# Patient Record
Sex: MALE | Race: ASIAN | NOT HISPANIC OR LATINO | URBAN - METROPOLITAN AREA
[De-identification: names, ages, dates, MRNs, and addresses within clinical notes are randomized per-mention and may not be internally consistent; named-entity substitution may affect disease eponyms.]

---

## 2019-03-13 ENCOUNTER — EMERGENCY (EMERGENCY)
Facility: HOSPITAL | Age: 29
LOS: 1 days | Discharge: HOME | End: 2019-03-13
Attending: EMERGENCY MEDICINE | Admitting: EMERGENCY MEDICINE
Payer: MEDICAID

## 2019-03-13 VITALS
SYSTOLIC BLOOD PRESSURE: 146 MMHG | RESPIRATION RATE: 14 BRPM | TEMPERATURE: 98 F | DIASTOLIC BLOOD PRESSURE: 102 MMHG | HEART RATE: 100 BPM | OXYGEN SATURATION: 98 %

## 2019-03-13 PROCEDURE — 99283 EMERGENCY DEPT VISIT LOW MDM: CPT | Mod: 25

## 2019-03-13 NOTE — ED ADULT NURSE NOTE - NS PRO AD NO ADVANCE DIRECTIVE
Subsequent Progress Note  Patient: Milana Colon  YOB: 1944  MRN: [id number]    Chief Complaint: CAD dizzy  Chief Complaint   Patient presents with    Coronary Artery Disease     1 yr f/u  [place] pt    Hypertension     Past Data:  1/2016 NSTEMI RCA KRISTA  Subjective/HPI  No angina  occ brief dizizness no falls no bleed  Breathing ok  1/2 PPD currently. regualr etoh         EKG:  Past Medical History:   Diagnosis Date    Alcohol abuse     CAD (coronary artery disease)     patient states no doctor has told him this / has 1 cardiac stent    Chronic back pain     Chronic kidney disease     Chronic sinusitis     DJD (degenerative joint disease) of knee     left knee DJD    Elevated PSA     History of blood transfusion 1980's    History of inferior wall myocardial infarction 01/2016    1 cardiac stent    HTN (hypertension)     meds .  1 yr    Hyperlipidemia     meds > 12 yrs    Smoker        Past Surgical History:   Procedure Laterality Date    ABDOMEN SURGERY  1961    spleenectomy due to [address]  2009    lumbar disc OR    CARDIAC SURGERY      stents    COLONOSCOPY      CORONARY ANGIOPLASTY WITH STENT PLACEMENT  1/29/2016    EYE SURGERY      to have Phaco with IOL OU 2/2018    HERNIA REPAIR      JOINT REPLACEMENT Left 1994    LTHR    JOINT REPLACEMENT Right 2009    RTKR    KNEE SURGERY Right 2011    arthroscopy    [place] MANIPULATN KNEE JT+ANESTHESIA Right 5/12/2017    RIGHT KNEE MANIPULATION UNDER ANESTHESIA performed by Michael Recinos MD at [address] OFFICE/OUTPT VISIT,PROCEDURE ONLY Bilateral 12/29/2017    RIGHT AND BILATERAL L 4-5 LYSIS OF SCAR DISKECTOMY INTERBODY CAGE FUSION POSTEROLATERAL FUSION PEDICLE SCREWS performed by Michele Munoz MD at [address] [place] [address]  2004    benign    SPLENECTOMY  1961    TOTAL KNEE ARTHROPLASTY Right 3/24/2017    RIGHT  KNEE TOTAL ARTHROPLASTY, ELHAM CEMENTED PERSONA  performed by Michael Recinos MD at [facility] HGB 11.9 12/26/2017    HCT 36.1 12/26/2017    .3 12/26/2017    MCH 33.8 12/26/2017    MCHC 33.0 12/26/2017    RDW 13.2 12/26/2017     12/26/2017     Lipids:  Lab Results   Component Value Date    CHOL 165 03/28/2016    CHOL 214 (H) 01/29/2016    CHOL 194 04/28/2014     Lab Results   Component Value Date    TRIG 64 03/28/2016    TRIG 144 01/29/2016    TRIG 91 04/28/2014     Lab Results   Component Value Date     (H) 03/28/2016    HDL 92 (H) 01/29/2016    HDL 82 (H) 04/28/2014     Lab Results   Component Value Date    LDLCALC 40 03/28/2016    LDLCALC 93 01/29/2016    LDLCALC 94 04/28/2014     No results found for: LABVLDL, VLDL  No results found for: CHOLHDLRATIO  CMP:    Lab Results   Component Value Date     12/26/2017    K 4.9 12/26/2017     12/26/2017    CO2 26 12/26/2017    BUN 16 12/26/2017    CREATININE 0.57 12/26/2017    GFRAA >60.0 12/26/2017    LABGLOM >60.0 12/26/2017    GLUCOSE 87 12/26/2017    PROT 6.6 03/28/2016    LABALBU 4.4 03/28/2016    CALCIUM 9.5 12/26/2017    BILITOT 0.7 03/28/2016    ALKPHOS 64 03/28/2016    AST 14 03/28/2016    ALT 22 03/28/2016     BMP:    Lab Results   Component Value Date     12/26/2017    K 4.9 12/26/2017     12/26/2017    CO2 26 12/26/2017    BUN 16 12/26/2017    LABALBU 4.4 03/28/2016    CREATININE 0.57 12/26/2017    CALCIUM 9.5 12/26/2017    GFRAA >60.0 12/26/2017    LABGLOM >60.0 12/26/2017    GLUCOSE 87 12/26/2017     Magnesium:    Lab Results   Component Value Date    MG 2.1 03/25/2017     TSH:  Lab Results   Component Value Date    TSH 0.851 01/29/2016       Patient Active Problem List   Diagnosis    HTN (hypertension)    Tobacco use    Hip pain    Knee pain    Chronic back pain    Elevated PSA    Primary osteoarthritis of right knee    Spondylosis of lumbar region without myelopathy or radiculopathy    Sacroiliitis, not elsewhere classified ([address]    Pure hypercholesterolemia    Charcot's joint of left ankle    Left ankle pain    Confusion caused by a drug ([address]    DDD (degenerative disc disease), lumbar    Osteoarthritis of spine with myelopathy, lumbosacral region    Chronic bilateral low back pain with bilateral sciatica    Postlaminectomy syndrome, lumbar region    Acquired spondylolisthesis of lumbosacral region    Neurogenic claudication due to lumbar spinal stenosis    HNP (herniated nucleus pulposus), lumbar    Spondylolisthesis at L4-L5 level    Anesthesia    Herniated nucleus pulposus, L4-5    NSTEMI [facility]non-ST elevated [facility] infarction) [facility]    S/P PTCA (percutaneous transluminal coronary angioplasty)       Medications Discontinued During This Encounter   Medication Reason    diclofenac (VOLTAREN) 50 MG EC tablet Duplicate Order    clopidogrel (PLAVIX) 75 MG tablet Reorder    metoprolol tartrate (LOPRESSOR) 50 MG tablet Reorder    atorvastatin (LIPITOR) 20 MG tablet Reorder       Modified Medications    Modified Medication Previous Medication    ATORVASTATIN (LIPITOR) 20 MG TABLET atorvastatin (LIPITOR) 20 MG tablet       Take 1 tablet by mouth daily    Take 1 tablet by mouth daily    CLOPIDOGREL (PLAVIX) 75 MG TABLET clopidogrel (PLAVIX) 75 MG tablet       Take 1 tablet by mouth daily    Take 1 tablet by mouth daily    METOPROLOL TARTRATE (LOPRESSOR) 50 MG TABLET metoprolol tartrate (LOPRESSOR) 50 MG tablet       Take 1 tablet by mouth 2 times daily    Take 1 tablet by mouth 2 times daily       Orders Placed This Encounter   Medications    clopidogrel (PLAVIX) 75 MG tablet     Sig: Take 1 tablet by mouth daily     Dispense:  30 tablet     Refill:  3    metoprolol tartrate (LOPRESSOR) 50 MG tablet     Sig: Take 1 tablet by mouth 2 times daily     Dispense:  30 tablet     Refill:  3    atorvastatin (LIPITOR) 20 MG tablet     Sig: Take 1 tablet by mouth daily     Dispense:  30 tablet     Refill:  3       Assessment/Plan:    1.  Essential hypertension  Stable on meds  - US Carotid Artery No

## 2019-03-13 NOTE — ED PROVIDER NOTE - OBJECTIVE STATEMENT
29 year-old male 29 year-old male presents to the Emergency Department for heroin overdose.  Patient reports taking heroin at home, approx. 10PM; patient was found on the bed by bother who called EMS after patient was not responding and had shallow breathing.  EMS called; gave 2 doses of Narcan on scene and patient became responsive.  Patient mentions report snorting "his normal dose" of heroin; had been clean for the past 2 months.  No other drug use or alcohol use reported.  No SI/HI; reports no intention of harming himself.  No fevers, chills, nausea, vomiting, chest pain, shortness of breath, abdominal pain.

## 2019-03-13 NOTE — ED ADULT TRIAGE NOTE - CHIEF COMPLAINT QUOTE
Pt BIBA from home for overdose. Pt was found unresponsive with slow shallow breathing by EMS. Pt was given 2 doses Narcan on the scene and became responsive. Pt admits to snorting heroine tonight. pt has a hx of substance abuse. Pt responsive to verbal stimuli in ED.

## 2019-03-13 NOTE — ED ADULT NURSE NOTE - OBJECTIVE STATEMENT
MIKEY RN:  Pt A/Ox3 states he used heroin tonight PTA and took "his normal dose." Pt states he was recently "clean" and this is the first time he has used since. Pt denies CP, SOB or any pain/medical complaints. Pt denies SI/HI, states he did not intend to harm himself. Pt calm and cooperative at present. Brother at bedside. Pt placed on tele monitor. Gown provided. PIV inserted with aseptic technique, blood drawn, labeled at bedside with 2 pt identifiers and sent to lab. Pt and family aware of POC, NAD. Will continue to monitor.

## 2019-03-13 NOTE — ED PROVIDER NOTE - NSFOLLOWUPINSTRUCTIONS_ED_ALL_ED_FT
Follow-up with your Primary Care Physician within 24-48 hours.  Please return to the Emergency Department immediately for any new, worsening or concerning symptoms.  Copy of your lab work, imaging and/or other testing performed in the ER is attached along with your discharge paperwork.  Please take this to your Primary Care Physician for further discussion, evaluation and comparison with your prior blood work results.     Please refrain from any alcohol or drug use in the future.

## 2019-03-13 NOTE — ED PROVIDER NOTE - CLINICAL SUMMARY MEDICAL DECISION MAKING FREE TEXT BOX
29 year-old male presents to the Emergency Department for heroin overdose; req. Narcan by EMS - patient alert upon initial exam.  Plan to put patient on end-tidal CO2 for observation and observe patient; no acute concerns for threatened airway or additional doses of Narcan.

## 2019-03-13 NOTE — ED PROVIDER NOTE - PRO INTERPRETER NEED 2
Internal Medicine Subjective





- Subjective


Service Date: 18


Patient seen and examined:: with staff


Patient is:: awake, in bed


Per staff patient has:: no adverse event





Internal Medicine Objective





- Results


Result Diagrams: 


 18 18:28





 18 18:


Recent Labs: 


 Laboratory Last Values











WBC  6.9 Th/cmm (4.8-10.8)   18  18:    


 


RBC  4.82 Mil/cmm (3.80-5.80)   18  18:    


 


Hgb  14.2 gm/dL (12-16)   18  18:    


 


Hct  42.2 % (41.0-60)   18  18:    


 


MCV  87.6 fl (80-99)   18  18:    


 


MCH  29.5 pg (27.0-31.0)   18  18    


 


MCHC Differential  33.7 pg (28.0-36.0)   18  18:    


 


RDW  13.3 % (11.5-20.0)   18  18:    


 


Plt Count  215 Th/cmm (150-400)   18  18:    


 


MPV  7.1 fl  18  18:    


 


Neutrophils %  57.5 % (40.0-80.0)   18  18:    


 


Lymphocytes %  31.7 % (20.0-50.0)   18  18:    


 


Monocytes %  9.3 % (2.0-10.0)   18  18:    


 


Eosinophils %  0.9 % (0.0-5.0)   18  18:    


 


Basophils %  0.6 % (0.0-2.0)   18  18:    


 


Sodium  131 mEq/L (136-145)  L  18  18:    


 


Potassium  4.0 mEq/L (3.5-5.1)   18  18:    


 


Chloride  102 mEq/L ()   18  18:    


 


Carbon Dioxide  28.0 mEq/L (21.0-31.0)   18  18:    


 


Anion Gap  5.0  (7.0-16.0)  L  03/16/18  18:28    


 


BUN  21 mg/dL (7-25)   18  18:28    


 


Creatinine  0.7 mg/dL (0.7-1.3)   18  18:28    


 


Est GFR ( Amer)  TNP   18  18:28    


 


Est GFR (Non-Af Amer)  TNP   18  18:28    


 


BUN/Creatinine Ratio  30.0   18  18:28    


 


Glucose  94 mg/dL ()   18  18:28    


 


Calcium  9.0 mg/dL (8.6-10.3)   18  18:28    


 


Total Bilirubin  0.5 mg/dL (0.3-1.0)   18  18:28    


 


AST  18 U/L (13-39)   18  18:28    


 


ALT  13 U/L (7-52)   18  18:28    


 


Alkaline Phosphatase  42 U/L ()   18  18:28    


 


Total Protein  6.5 gm/dL (6.0-8.3)   18  18:28    


 


Albumin  3.7 gm/dL (4.2-5.5)  L  18  18:28    


 


Globulin  2.8 gm/dL  18  18:28    


 


Albumin/Globulin Ratio  1.3  (1.0-1.8)   18  18:28    


 


TSH  0.56 uIU/ml (0.34-5.60)   18  18:28    


 


Valproic Acid  47.3 ug/mL (50.0-100.0)  L  18  19:34    














- Physical Exam


Vitals and I&O: 


 Vital Signs











Temp  97 F   18 14:00


 


Pulse  61   18 14:00


 


Resp  20   18 14:00


 


BP  136/71   18 14:00


 


Pulse Ox  97   18 14:00








 Intake & Output











 18





 06:59 18:59 06:59


 


Intake Total 840 1400 


 


Balance 840 1400 


 


Intake:   


 


  Oral 840 1400 


 


Other:   


 


  # Voids 1 5 


 


  # Bowel Movements  1 











Active Medications: 


Current Medications





Acetaminophen (Tylenol)  650 mg PO Q4HR PRN


   PRN Reason: Mild Pain / Temp above 100


   Stop: 05/15/18 22:05


Al Hydrox/Mg Hydrox/Simethicone (Maalox)  30 ml PO Q4HR PRN


   PRN Reason: GI DISTRESS


   Stop: 05/15/18 22:05


Benztropine Mesylate (Cogentin)  1 mg PO BID PRN


   PRN Reason: extrapyramidal side effects


   Stop: 18 08:50


   Last Admin: 18 10:40 Dose:  1 mg


Divalproex Sodium (Depakote Er)  500 mg PO Q12HR DANYA


   PRN Reason: Protocol


   Stop: 18 08:59


   Last Admin: 18 09:19 Dose:  500 mg


Docusate Sodium (Colace)  100 mg PO BID DANYA


   Stop: 18 08:59


   Last Admin: 18 16:13 Dose:  100 mg


Levothyroxine Sodium (Synthroid)  0.1 mg PO QDAC DANYA


   Stop: 18 07:29


   Last Admin: 18 06:51 Dose:  0.1 mg


Lorazepam (Ativan)  0.5 mg PO Q4HR PRN; Protocol


   PRN Reason: Agitation


   Stop: 18 08:59


   Last Admin: 18 13:52 Dose:  0.5 mg


Magnesium Hydroxide (Milk Of Magnesia)  30 ml PO HS PRN


   PRN Reason: Constipation


Magnesium Hydroxide (Milk Of Magnesia)  30 ml PO DAILY PRN


   PRN Reason: Constipation


   Stop: 05/15/18 23:17


Metoprolol Succinate (Toprol Xl)  50 mg PO DAILY DANYA


   Stop: 18 08:59


   Last Admin: 18 09:20 Dose:  50 mg


Olanzapine (Zyprexa)  20 mg PO HS DANYA


   PRN Reason: Protocol


   Stop: 18 20:59


   Last Admin: 18 20:20 Dose:  20 mg








General: demented


HEENT: NC/AT, PERRLA, EOMI, anicteric sclerae, throat clear


Neck: Supple, No JVD, No thyromegaly, No LAD


Lungs: CTAB


Cardiovascular: RRR, Normal S1, Normal S2, without murmur


Abdomen: soft, non-tender, non-distended


Extremities: clear


Neurological: no change





- Procedures


Procedures: 


 Procedures











Procedure Code Date


 


[facility] PSYCHOTHERAPY 96914 16


 


GROUP PSYCHOTHERAPY [id number] 16


 


GROUP PSYCHOTHERAPY [id number] 16


 


GROUP PSYCHOTHERAPY [id number] 16


 


INDIVID PSYCHOTHERAP NEC 94.39 08


 


OTHER GROUP THERAPY 94.44 08/27/15


 


RECREATIONAL THERAPY 93.81 13














Internal Medicine Assmt/Plan





- Assessment


Assessment: 





1.HTN


2.HYPOTHROIDISM


3.DJD.


4.DEMENTIA..








- Plan


Plan: 





continue on current medication and diet.





Nutritional Asmnt/Malnutr-PDOC





- Dietary Evaluation


Malnutrition Findings (Please click <Entered> for more info): 








Nutritional Asmnt/Malnutrition                             Start:  18 13:

30


Text:                                                      Status: Complete    

  


Freq:                                                                          

  


 Document     18 13:30  MAKENZIE  (Rec: 18 13:36  MAKENZIE  [id number])


 Nutritional Asmnt/Malnutrition


     Patient General Information


      Nutritional Screening                      Low Risk


      Diagnosis                                  psychosis NOS


      Pertinent Medical Hx/Surgical Hx           HTN, hypothyroidism, dementia,


                                                 psychosis, DJD


      Subjective Information                     Per EMR PO intake %. Per


                                                 nurse notes, pt remains


                                                 withdrawn.


      Current Diet Order/ Nutrition Support      regular


      Pertinent Medications                      colace, synthorid


      Pertinent Labs                             3/16 Na 131, Alb 3.7


     Nutritional Hx/Data


      Height                                     1.8 m


      Height (Calculated Centimeters)            180.3


      Current Weight (lbs)                       74.389 kg


      Weight (Calculated Kilograms)              74.4


      Weight (Calculated Grams)                  15810.1


      Ideal Body Weight                          172


      Body Mass Index (BMI)                      22.8


      Weight Status                              Approriate


     GI Symptoms


      GI Symptoms                                None


      Last BM                                    3/21


      Difficult in:                              None


      Skin Integrity/Comment:                    intact


      Current %PO                                Good (%)


     Estimated Nutritional Goals


      BEE in Kcals:                              Using Current wt


      Calories/Kcals/Kg                          25-30


      Kcals Calculated                           4483-4782


      Protein:                                   Using Current wt


      Protein g/k


      Protein Calculated                         75


      Fluid: ml                                  1875-2250ml (1ml/kcal)


     Nutritional Problem


      No current Nutrition Prob


       Problem                                   N/A


     Intervention/Recommendation


      Comments                                   1. Continue with current diet


                                                 as ordered.


                                                 2. Monitor PO intake, wt, labs


                                                 and skin integrity


                                                 3. F/U as low risk in 7 days,


                                                 3/29


     Expected Outcomes/Goals


      Expected Outcomes/Goals                    1. PO intake to meet at least


                                                 75% of nutritional needs.


                                                 2. Wt stability, skin to


                                                 remain intact, labs to


                                                 approach WNL. English

## 2019-03-13 NOTE — ED ADULT NURSE NOTE - NSIMPLEMENTINTERV_GEN_ALL_ED
Implemented All Universal Safety Interventions:  [place] to call system. Call bell, personal items and telephone within reach. Instruct patient to call for assistance. Room bathroom lighting operational. Non-slip footwear when patient is off stretcher. Physically safe environment: no spills, clutter or unnecessary equipment. Stretcher in lowest position, wheels locked, appropriate side rails in place.

## 2019-03-13 NOTE — ED PROVIDER NOTE - PROGRESS NOTE DETAILS
Stacy BRANHAM: Patient reassessed; breathing spontaneously and AAOx3 - continued observation for further metabolization Stacy BRANHAM: Patient reassessed; awake, alert and ambulating w/o difficulty.  No resp. distress and breathing spontaneously throughout stay without any doses of Narcan.  Upon repeat questioning, patient declined any SI/HI.  Patient is with brother and plans to go home with him. [place]: at time of discharge pt was asymptomatic. observed in ED for several hrs w/ only improvement in symptoms. Pt awake and alert. Vitals improved. Asymptomatic. Denies SI/HI. Steady unassisted gait. Tolerating PO. Clinically sober and not withdrawing, given copy of results, comfortable for dc, outpt PMD f/u.

## 2019-03-13 NOTE — ED PROVIDER NOTE - ATTENDING CONTRIBUTION TO CARE
marc: hx from pts brother at bedside. Some hx available from pt.   past heroin uses; not used for several months until 1030pm tonite when he snorted heroin.   brother found pt unconscious; ems arrived and adminsitered narcane with good response.   in ED: pt tired but awake. he does not state why he used heroin.   exam otherwise unremarkable.   labs pending. pt will need to be reassessed. signed over 12am marc: hx from pts brother at bedside. Some hx available from pt.   past heroin uses; not used for several months until 1030pm tonite when he snorted heroin.   brother found pt unconscious; ems arrived and adminsitered narcane with good response.   in ED: pt tired but awake. he does not state why he used heroin.   exam otherwise unremarkable.   labs pending. pt will need to be reassessed. signed over 12am.

## 2019-03-14 VITALS
HEART RATE: 94 BPM | OXYGEN SATURATION: 99 % | RESPIRATION RATE: 17 BRPM | SYSTOLIC BLOOD PRESSURE: 132 MMHG | DIASTOLIC BLOOD PRESSURE: 94 MMHG

## 2019-03-14 LAB
ALBUMIN SERPL ELPH-MCNC: 5 G/DL — SIGNIFICANT CHANGE UP (ref 3.3–5)
ALP SERPL-CCNC: 59 U/L — SIGNIFICANT CHANGE UP (ref 40–120)
ALT FLD-CCNC: 25 U/L — SIGNIFICANT CHANGE UP (ref 4–41)
ANION GAP SERPL CALC-SCNC: 17 MMO/L — HIGH (ref 7–14)
APAP SERPL-MCNC: < 15 UG/ML — LOW (ref 15–25)
AST SERPL-CCNC: 25 U/L — SIGNIFICANT CHANGE UP (ref 4–40)
BASE EXCESS BLDV CALC-SCNC: 5.4 MMOL/L — SIGNIFICANT CHANGE UP
BASE EXCESS BLDV CALC-SCNC: 6.6 MMOL/L — SIGNIFICANT CHANGE UP
BASOPHILS # BLD AUTO: 0.02 K/UL — SIGNIFICANT CHANGE UP (ref 0–0.2)
BASOPHILS NFR BLD AUTO: 0.2 % — SIGNIFICANT CHANGE UP (ref 0–2)
BILIRUB SERPL-MCNC: 0.2 MG/DL — SIGNIFICANT CHANGE UP (ref 0.2–1.2)
BLOOD GAS VENOUS - CREATININE: 0.81 MG/DL — SIGNIFICANT CHANGE UP (ref 0.5–1.3)
BLOOD GAS VENOUS - CREATININE: 0.99 MG/DL — SIGNIFICANT CHANGE UP (ref 0.5–1.3)
BUN SERPL-MCNC: 14 MG/DL — SIGNIFICANT CHANGE UP (ref 7–23)
CALCIUM SERPL-MCNC: 10 MG/DL — SIGNIFICANT CHANGE UP (ref 8.4–10.5)
CHLORIDE BLDV-SCNC: 103 MMOL/L — SIGNIFICANT CHANGE UP (ref 96–108)
CHLORIDE BLDV-SCNC: 103 MMOL/L — SIGNIFICANT CHANGE UP (ref 96–108)
CHLORIDE SERPL-SCNC: 96 MMOL/L — LOW (ref 98–107)
CK SERPL-CCNC: 71 U/L — SIGNIFICANT CHANGE UP (ref 30–200)
CO2 SERPL-SCNC: 27 MMOL/L — SIGNIFICANT CHANGE UP (ref 22–31)
CREAT SERPL-MCNC: 1.11 MG/DL — SIGNIFICANT CHANGE UP (ref 0.5–1.3)
EOSINOPHIL # BLD AUTO: 0.09 K/UL — SIGNIFICANT CHANGE UP (ref 0–0.5)
EOSINOPHIL NFR BLD AUTO: 1.1 % — SIGNIFICANT CHANGE UP (ref 0–6)
ETHANOL BLD-MCNC: < 10 MG/DL — SIGNIFICANT CHANGE UP
GAS PNL BLDV: 135 MMOL/L — LOW (ref 136–146)
GAS PNL BLDV: 136 MMOL/L — SIGNIFICANT CHANGE UP (ref 136–146)
GLUCOSE BLDV-MCNC: 131 — HIGH (ref 70–99)
GLUCOSE BLDV-MCNC: 159 — HIGH (ref 70–99)
GLUCOSE SERPL-MCNC: 159 MG/DL — HIGH (ref 70–99)
HCO3 BLDV-SCNC: 27 MMOL/L — SIGNIFICANT CHANGE UP (ref 20–27)
HCO3 BLDV-SCNC: 27 MMOL/L — SIGNIFICANT CHANGE UP (ref 20–27)
HCT VFR BLD CALC: 44.4 % — SIGNIFICANT CHANGE UP (ref 39–50)
HCT VFR BLDV CALC: 39.4 % — SIGNIFICANT CHANGE UP (ref 39–51)
HCT VFR BLDV CALC: 44.9 % — SIGNIFICANT CHANGE UP (ref 39–51)
HGB BLD-MCNC: 14.3 G/DL — SIGNIFICANT CHANGE UP (ref 13–17)
HGB BLDV-MCNC: 12.8 G/DL — LOW (ref 13–17)
HGB BLDV-MCNC: 14.7 G/DL — SIGNIFICANT CHANGE UP (ref 13–17)
IMM GRANULOCYTES NFR BLD AUTO: 0.2 % — SIGNIFICANT CHANGE UP (ref 0–1.5)
LACTATE BLDV-MCNC: 1.9 MMOL/L — SIGNIFICANT CHANGE UP (ref 0.5–2)
LACTATE BLDV-MCNC: 2.3 MMOL/L — HIGH (ref 0.5–2)
LYMPHOCYTES # BLD AUTO: 3.33 K/UL — HIGH (ref 1–3.3)
LYMPHOCYTES # BLD AUTO: 39.1 % — SIGNIFICANT CHANGE UP (ref 13–44)
MCHC RBC-ENTMCNC: 28.9 PG — SIGNIFICANT CHANGE UP (ref 27–34)
MCHC RBC-ENTMCNC: 32.2 % — SIGNIFICANT CHANGE UP (ref 32–36)
MCV RBC AUTO: 89.7 FL — SIGNIFICANT CHANGE UP (ref 80–100)
MONOCYTES # BLD AUTO: 0.45 K/UL — SIGNIFICANT CHANGE UP (ref 0–0.9)
MONOCYTES NFR BLD AUTO: 5.3 % — SIGNIFICANT CHANGE UP (ref 2–14)
NEUTROPHILS # BLD AUTO: 4.6 K/UL — SIGNIFICANT CHANGE UP (ref 1.8–7.4)
NEUTROPHILS NFR BLD AUTO: 54.1 % — SIGNIFICANT CHANGE UP (ref 43–77)
NRBC # FLD: 0 K/UL — LOW (ref 25–125)
PCO2 BLDV: 63 MMHG — HIGH (ref 41–51)
PCO2 BLDV: 66 MMHG — HIGH (ref 41–51)
PH BLDV: 7.31 PH — LOW (ref 7.32–7.43)
PH BLDV: 7.32 PH — SIGNIFICANT CHANGE UP (ref 7.32–7.43)
PLATELET # BLD AUTO: 305 K/UL — SIGNIFICANT CHANGE UP (ref 150–400)
PMV BLD: 10.8 FL — SIGNIFICANT CHANGE UP (ref 7–13)
PO2 BLDV: 31 MMHG — LOW (ref 35–40)
PO2 BLDV: < 24 MMHG — LOW (ref 35–40)
POTASSIUM BLDV-SCNC: 3.6 MMOL/L — SIGNIFICANT CHANGE UP (ref 3.4–4.5)
POTASSIUM BLDV-SCNC: 4.3 MMOL/L — SIGNIFICANT CHANGE UP (ref 3.4–4.5)
POTASSIUM SERPL-MCNC: 3.8 MMOL/L — SIGNIFICANT CHANGE UP (ref 3.5–5.3)
POTASSIUM SERPL-SCNC: 3.8 MMOL/L — SIGNIFICANT CHANGE UP (ref 3.5–5.3)
PROT SERPL-MCNC: 8.1 G/DL — SIGNIFICANT CHANGE UP (ref 6–8.3)
RBC # BLD: 4.95 M/UL — SIGNIFICANT CHANGE UP (ref 4.2–5.8)
RBC # FLD: 12.3 % — SIGNIFICANT CHANGE UP (ref 10.3–14.5)
SALICYLATES SERPL-MCNC: < 5 MG/DL — LOW (ref 15–30)
SAO2 % BLDV: 34.2 % — LOW (ref 60–85)
SAO2 % BLDV: 50.3 % — LOW (ref 60–85)
SODIUM SERPL-SCNC: 140 MMOL/L — SIGNIFICANT CHANGE UP (ref 135–145)
WBC # BLD: 8.51 K/UL — SIGNIFICANT CHANGE UP (ref 3.8–10.5)
WBC # FLD AUTO: 8.51 K/UL — SIGNIFICANT CHANGE UP (ref 3.8–10.5)

## 2019-03-14 RX ORDER — SODIUM CHLORIDE 9 MG/ML
2000 INJECTION, SOLUTION INTRAVENOUS ONCE
Qty: 0 | Refills: 0 | Status: COMPLETED | OUTPATIENT
Start: 2019-03-14 | End: 2019-03-14

## 2019-03-14 RX ADMIN — SODIUM CHLORIDE 2000 MILLILITER(S): 9 INJECTION, SOLUTION INTRAVENOUS at 00:07

## 2019-12-22 ENCOUNTER — EMERGENCY (EMERGENCY)
Facility: HOSPITAL | Age: 29
LOS: 1 days | Discharge: HOME | End: 2019-12-22
Admitting: EMERGENCY MEDICINE
Payer: MEDICAID

## 2019-12-22 VITALS
OXYGEN SATURATION: 99 % | TEMPERATURE: 98 F | HEART RATE: 104 BPM | SYSTOLIC BLOOD PRESSURE: 121 MMHG | RESPIRATION RATE: 16 BRPM | DIASTOLIC BLOOD PRESSURE: 89 MMHG

## 2019-12-22 VITALS
DIASTOLIC BLOOD PRESSURE: 72 MMHG | HEART RATE: 81 BPM | SYSTOLIC BLOOD PRESSURE: 115 MMHG | TEMPERATURE: 98 F | OXYGEN SATURATION: 100 % | RESPIRATION RATE: 18 BRPM

## 2019-12-22 LAB
ALBUMIN SERPL ELPH-MCNC: 4.9 G/DL — SIGNIFICANT CHANGE UP (ref 3.3–5)
ALP SERPL-CCNC: 70 U/L — SIGNIFICANT CHANGE UP (ref 40–120)
ALT FLD-CCNC: 32 U/L — SIGNIFICANT CHANGE UP (ref 4–41)
ANION GAP SERPL CALC-SCNC: 15 MMO/L — HIGH (ref 7–14)
APPEARANCE UR: CLEAR — SIGNIFICANT CHANGE UP
APTT BLD: 32.6 SEC — SIGNIFICANT CHANGE UP (ref 27.5–36.3)
AST SERPL-CCNC: 21 U/L — SIGNIFICANT CHANGE UP (ref 4–40)
BACTERIA # UR AUTO: SIGNIFICANT CHANGE UP
BASOPHILS # BLD AUTO: 0.03 K/UL — SIGNIFICANT CHANGE UP (ref 0–0.2)
BASOPHILS NFR BLD AUTO: 0.3 % — SIGNIFICANT CHANGE UP (ref 0–2)
BILIRUB SERPL-MCNC: 0.4 MG/DL — SIGNIFICANT CHANGE UP (ref 0.2–1.2)
BILIRUB UR-MCNC: NEGATIVE — SIGNIFICANT CHANGE UP
BLOOD UR QL VISUAL: NEGATIVE — SIGNIFICANT CHANGE UP
BUN SERPL-MCNC: 13 MG/DL — SIGNIFICANT CHANGE UP (ref 7–23)
CALCIUM SERPL-MCNC: 10.1 MG/DL — SIGNIFICANT CHANGE UP (ref 8.4–10.5)
CHLORIDE SERPL-SCNC: 101 MMOL/L — SIGNIFICANT CHANGE UP (ref 98–107)
CO2 SERPL-SCNC: 22 MMOL/L — SIGNIFICANT CHANGE UP (ref 22–31)
COLOR SPEC: YELLOW — SIGNIFICANT CHANGE UP
CREAT SERPL-MCNC: 0.78 MG/DL — SIGNIFICANT CHANGE UP (ref 0.5–1.3)
EOSINOPHIL # BLD AUTO: 0.08 K/UL — SIGNIFICANT CHANGE UP (ref 0–0.5)
EOSINOPHIL NFR BLD AUTO: 0.7 % — SIGNIFICANT CHANGE UP (ref 0–6)
GLUCOSE SERPL-MCNC: 93 MG/DL — SIGNIFICANT CHANGE UP (ref 70–99)
GLUCOSE UR-MCNC: NEGATIVE — SIGNIFICANT CHANGE UP
HCT VFR BLD CALC: 44.1 % — SIGNIFICANT CHANGE UP (ref 39–50)
HGB BLD-MCNC: 14.3 G/DL — SIGNIFICANT CHANGE UP (ref 13–17)
HYALINE CASTS # UR AUTO: NEGATIVE — SIGNIFICANT CHANGE UP
IMM GRANULOCYTES NFR BLD AUTO: 0.3 % — SIGNIFICANT CHANGE UP (ref 0–1.5)
INR BLD: 0.99 — SIGNIFICANT CHANGE UP (ref 0.88–1.17)
KETONES UR-MCNC: NEGATIVE — SIGNIFICANT CHANGE UP
LEUKOCYTE ESTERASE UR-ACNC: NEGATIVE — SIGNIFICANT CHANGE UP
LYMPHOCYTES # BLD AUTO: 2.76 K/UL — SIGNIFICANT CHANGE UP (ref 1–3.3)
LYMPHOCYTES # BLD AUTO: 25.6 % — SIGNIFICANT CHANGE UP (ref 13–44)
MCHC RBC-ENTMCNC: 28.5 PG — SIGNIFICANT CHANGE UP (ref 27–34)
MCHC RBC-ENTMCNC: 32.4 % — SIGNIFICANT CHANGE UP (ref 32–36)
MCV RBC AUTO: 88 FL — SIGNIFICANT CHANGE UP (ref 80–100)
MONOCYTES # BLD AUTO: 0.53 K/UL — SIGNIFICANT CHANGE UP (ref 0–0.9)
MONOCYTES NFR BLD AUTO: 4.9 % — SIGNIFICANT CHANGE UP (ref 2–14)
MUCOUS THREADS # UR AUTO: SIGNIFICANT CHANGE UP
NEUTROPHILS # BLD AUTO: 7.35 K/UL — SIGNIFICANT CHANGE UP (ref 1.8–7.4)
NEUTROPHILS NFR BLD AUTO: 68.2 % — SIGNIFICANT CHANGE UP (ref 43–77)
NITRITE UR-MCNC: NEGATIVE — SIGNIFICANT CHANGE UP
NRBC # FLD: 0 K/UL — SIGNIFICANT CHANGE UP (ref 0–0)
PH UR: 6.5 — SIGNIFICANT CHANGE UP (ref 5–8)
PLATELET # BLD AUTO: 313 K/UL — SIGNIFICANT CHANGE UP (ref 150–400)
PMV BLD: 10.4 FL — SIGNIFICANT CHANGE UP (ref 7–13)
POTASSIUM SERPL-MCNC: 4.4 MMOL/L — SIGNIFICANT CHANGE UP (ref 3.5–5.3)
POTASSIUM SERPL-SCNC: 4.4 MMOL/L — SIGNIFICANT CHANGE UP (ref 3.5–5.3)
PROT SERPL-MCNC: 8.2 G/DL — SIGNIFICANT CHANGE UP (ref 6–8.3)
PROT UR-MCNC: 20 — SIGNIFICANT CHANGE UP
PROTHROM AB SERPL-ACNC: 11.3 SEC — SIGNIFICANT CHANGE UP (ref 9.8–13.1)
RBC # BLD: 5.01 M/UL — SIGNIFICANT CHANGE UP (ref 4.2–5.8)
RBC # FLD: 13.5 % — SIGNIFICANT CHANGE UP (ref 10.3–14.5)
RBC CASTS # UR COMP ASSIST: SIGNIFICANT CHANGE UP (ref 0–?)
SODIUM SERPL-SCNC: 138 MMOL/L — SIGNIFICANT CHANGE UP (ref 135–145)
SP GR SPEC: 1.03 — SIGNIFICANT CHANGE UP (ref 1–1.04)
SQUAMOUS # UR AUTO: SIGNIFICANT CHANGE UP
UROBILINOGEN FLD QL: NORMAL — SIGNIFICANT CHANGE UP
WBC # BLD: 10.78 K/UL — HIGH (ref 3.8–10.5)
WBC # FLD AUTO: 10.78 K/UL — HIGH (ref 3.8–10.5)
WBC UR QL: SIGNIFICANT CHANGE UP (ref 0–?)

## 2019-12-22 PROCEDURE — 99220: CPT

## 2019-12-22 PROCEDURE — 72158 MRI LUMBAR SPINE W/O & W/DYE: CPT | Mod: 26

## 2019-12-22 RX ORDER — LIDOCAINE 4 G/100G
1 CREAM TOPICAL ONCE
Refills: 0 | Status: COMPLETED | OUTPATIENT
Start: 2019-12-22 | End: 2019-12-22

## 2019-12-22 RX ORDER — CYCLOBENZAPRINE HYDROCHLORIDE 10 MG/1
10 TABLET, FILM COATED ORAL ONCE
Refills: 0 | Status: COMPLETED | OUTPATIENT
Start: 2019-12-22 | End: 2019-12-22

## 2019-12-22 RX ORDER — KETOROLAC TROMETHAMINE 30 MG/ML
30 SYRINGE (ML) INJECTION ONCE
Refills: 0 | Status: DISCONTINUED | OUTPATIENT
Start: 2019-12-22 | End: 2019-12-22

## 2019-12-22 RX ADMIN — CYCLOBENZAPRINE HYDROCHLORIDE 10 MILLIGRAM(S): 10 TABLET, FILM COATED ORAL at 11:42

## 2019-12-22 RX ADMIN — LIDOCAINE 1 PATCH: 4 CREAM TOPICAL at 11:43

## 2019-12-22 RX ADMIN — Medication 30 MILLIGRAM(S): at 11:43

## 2019-12-22 NOTE — ED CDU PROVIDER DISPOSITION NOTE - ATTENDING CONTRIBUTION TO CARE
admitted to cdu with concern for epidural absces given back pain and hx of ivdu. mri wnl. back pain improved.  Dc'd home

## 2019-12-22 NOTE — ED CDU PROVIDER INITIAL DAY NOTE - PROGRESS NOTE DETAILS
MRI neg for epidural abscess or fluid collection, significant for small disk herniation. Pt is stable for dc with spine/PT f/u.

## 2019-12-22 NOTE — ED PROVIDER NOTE - SKIN, MLM
Skin normal color for race, warm, dry and intact. No evidence of rash. Trilobed Flap Text: The defect edges were debeveled with a #15 scalpel blade.  Given the location of the defect and the proximity to free margins a trilobed flap was deemed most appropriate.  Using a sterile surgical marker, an appropriate trilobed flap drawn around the defect.    The area thus outlined was incised deep to adipose tissue with a #15 scalpel blade.  The skin margins were undermined to an appropriate distance in all directions utilizing iris scissors.

## 2019-12-22 NOTE — ED CDU PROVIDER INITIAL DAY NOTE - ATTENDING CONTRIBUTION TO CARE
28 y/o male pmh IVDA wq/ low back pain x1 month, found to have low grade fever in ER- sent to CDU for MRI  to r/o epidural abscess

## 2019-12-22 NOTE — ED CDU PROVIDER INITIAL DAY NOTE - OBJECTIVE STATEMENT
30 y/o male pmh IVDA c/o low back pain x1 month. Pt admits to stopping heroin x1 month ago. Pt admits to intermittent low back pain, achy/sharp in nature. Denies recent fall, injury or trauma. Denies numbness, tingling, weakness, dizziness, syncope, fever or chills. In the ER pt found to have low grade fever and mild tachycardia. Sent to CDU for MRI to r/o epidural abscess.

## 2019-12-22 NOTE — ED PROVIDER NOTE - OBJECTIVE STATEMENT
28 Y/O M PMH IV drug user c/o low back pain. Pt states he stopped using heroin one month ago, states he had been using for 5-6 months currently on vitriol. Pt denies numbness, weakness, tingling or bowel or bladder habits. Pt states the pain was initially intermittent currently 9/10 states the pain feels achy and is occasionally sharp. Pt states he initially attributed the pain to withdrawal but states it has continued. Denies urinary symptoms, denies numbness, tingling, weakness, bowel or bladder changes. Pt denies any other sx or complaints.

## 2019-12-22 NOTE — ED PROVIDER NOTE - CARE PLAN
Principal Discharge DX:	Low back pain  Secondary Diagnosis:	IV drug abuse Principal Discharge DX:	Low back pain  Secondary Diagnosis:	IV drug abuse  Secondary Diagnosis:	Leukocytosis

## 2019-12-22 NOTE — ED CDU PROVIDER INITIAL DAY NOTE - MEDICAL DECISION MAKING DETAILS
30 y/o male pmh IVDA wq/ low back pain x1 month, found to have low grade fever in ER- sent to CDU for MRI  to r/o epidural abscess

## 2019-12-22 NOTE — ED CDU PROVIDER DISPOSITION NOTE - PATIENT PORTAL LINK FT
You can access the FollowMyHealth Patient Portal offered by [facility] by registering at the following website: [email or web address]. By joining [employer]’s FollowMyHealth portal, you will also be able to view your health information using other applications (apps) compatible with our system.

## 2019-12-23 LAB — SPECIMEN SOURCE: SIGNIFICANT CHANGE UP

## 2019-12-24 LAB — BACTERIA UR CULT: SIGNIFICANT CHANGE UP

## 2020-01-16 ENCOUNTER — HOSPITAL ENCOUNTER (INPATIENT)
Dept: HOSPITAL 74 - YASAS | Age: 30
LOS: 3 days | Discharge: LEFT BEFORE DISCHARGE | DRG: 770 | End: 2020-01-19
Attending: NEUROMUSCULOSKELETAL MEDICINE & OMM | Admitting: NEUROMUSCULOSKELETAL MEDICINE & OMM
Payer: COMMERCIAL

## 2020-01-16 VITALS — BODY MASS INDEX: 25.3 KG/M2

## 2020-01-16 DIAGNOSIS — F17.210: ICD-10-CM

## 2020-01-16 DIAGNOSIS — F11.20: Primary | ICD-10-CM

## 2020-01-16 DIAGNOSIS — Z86.59: ICD-10-CM

## 2020-01-16 PROCEDURE — HZ42ZZZ GROUP COUNSELING FOR SUBSTANCE ABUSE TREATMENT, COGNITIVE-BEHAVIORAL: ICD-10-PCS | Performed by: ALLERGY & IMMUNOLOGY

## 2020-01-16 RX ADMIN — Medication PRN MG: at 21:39

## 2020-01-16 RX ADMIN — Medication SCH MG: at 21:39

## 2020-01-16 NOTE — PN
Teaching Attending Note


Name of Resident: Joey Bah





ATTENDING PHYSICIAN STATEMENT





I saw and evaluated the patient.


I reviewed the resident's note and discussed the case with the resident.


I agree with the resident's findings and plan as documented.








SUBJECTIVE:pt reports latest injection of Vivitrol 12/24/19 








OBJECTIVE:  wnwd 


 Vital Signs - 24 hr











  01/16/20 01/16/20





  14:19 18:09


 


Temperature 97.7 F 97.8 F


 


Pulse Rate 94 H 84


 


Respiratory 18 16





Rate  


 


Blood Pressure 114/76 133/73

















ASSESSMENT AND PLAN:  OUD on antagonist therapy - rehab

## 2020-01-16 NOTE — HP
COWS





- Scale


Resting Pulse: 1= MD 


Sweatin= Chills/Flushing


Restless Observation: 0= Sits Still


Pupil Size: 1= Pupils >than Normal


Bone or Joint Aches: 1= Mild Discomfort


Runny Nose/ Eye Tearin= Nasal Congestion


GI Upset > 30mins: 2= Nausea/Diarrhea


Tremor Observation: 1= Tremor Felt, Not Seen


Yawning Observation: 0= None


Anxiety or Irritability: 1=Feels Anxious/Irritable


Goose Flesh Skin: 0=Smooth Skin


COWS Score: 9





CIWA Score





- Admission Criteria


[facility]ASAS Guidelines: Admission for Medically Managed Detox: 


Requires at least one of the followin. CIWA greater than 12


2. Seizures within the past 24 hours


3. Delirium tremens within the past 24 hours


4. Hallucinations within the past 24 hours


5. Acute intervention needed for co  occurring medical disorder


6. Acute intervention needed for co  occurring psychiatric disorder


7. Severe withdrawal that cannot be handled at a lower level of care (continued


    vomiting, continued diarrhea, abnormal vital signs) requiring intravenous


    medication and/or fluids


8. Pregnancy








Admitting History and Physical





- Admission


History of Present Illness: 





31 yo m w/ PMH herion abuse comes in for detox from heroin.





Patient endorses using 6-8 bags of herion IV daily for the past 2 weeks. He was 

recently admitted to detox at [facility] and was discharged on outpatient 

Vivitrol. His last Vivitrol injection was . After discharge from 

[facility], he was clean for 2 months until 2 weeks ago. Patient endorses 

overdosing 8 months ago. Patient has a narcan kit at home.





Patient was tested for HIV/HCV 2 months ago at [facility] and states that they 

were both negative. Patient declined repeat testing at this time.





Patient endorses smoking 2 cigarettes per day.  





Patient does not meet detox criteria as he is on vivitrol. 


Patient agrees to stay for rehab, however.   


History Source: Patient


Limitations to Obtaining History: No Limitations





- Past Medical History


Psych: Yes: Addictions





- Past Surgical History


Past Surgical History: Yes: None





- Smoking History


Smoking history: Current every day smoker


Have you smoked in the past 12 months: Yes


Aproximately how many cigarettes per day: 2





Admission ROS S





- HPI


Allergies/Adverse Reactions: 


 Allergies











Allergy/AdvReac Type Severity Reaction Status Date / Time


 


No Known Allergies Allergy   Verified 20 14:19














- Ebola screening


Have you traveled outside of the country in the last 21 days: No


Have you had contact with anyone from an Ebola affected area: No





- Review of Systems


Constitutional: Weakness


Musculoskeletal: reports: Back Pain


Psychiatric: reports: Judgement Intact, Mood/Affect Appropiate, Orientated x3





Patient History





- Smoking Cessation


Smoking history: Current every day smoker


Have you smoked in the past 12 months: Yes


Initiated information on smoking cessation: Yes


'Breaking Loose' booklet given: 20





- Substances abused


  ** Heroin


Substance route: Injection


Frequency: Daily


Amount used: 6-7 bags


Age of first use: 23


Date of last use: 01/15/20





Admission Physical Exam [facility]





- Vital Signs


Vital Signs: 


 Vital Signs - 24 hr











  20





  14:19


 


Temperature 97.7 F


 


Pulse Rate 94 H


 


Respiratory 18





Rate 


 


Blood Pressure 114/76














- Physical


General Appearance: Yes: No Apparent Distress, Nourished, Appropriately Dressed


HEENTM: Yes: EOMI, Normal ENT Inspection, KASSANDRA


Respiratory: Yes: Chest Non-Tender, Lungs Clear, Normal Breath Sounds, No 

Respiratory Distress, No Accessory Muscle Use


Neck: Yes: Trachea in good position


Cardiology: Yes: Regular Rhythm, Regular Rate, S1, S2.  No: JVD, Murmur, Gallop/

S3, Gallop/S4


Abdominal: Yes: Normal Bowel Sounds, Non Tender, Flat, Soft


Neurological: Yes: CNs II-XII NML intact, Fully Oriented, Alert, Motor Strength 

5/5, Normal Mood/Affect, Normal Response


Integumentary: Yes: Normal Color, Dry, Warm





- Diagnostic


(1) Opioid dependence


Current Visit: Yes   Status: Acute   





Breathalyzer





- Breathalyzer


Breathalyzer: 0





Urine Drug Screen





- Test Device


Lot number: [id number]


Expiration date: 21





- Control


Is test valid?: Yes





- Results


Drug screen NEGATIVE: No


Urine drug screen results: MOP-Opiates





Inpatient Rehab Admission





- Rehab Decision to Admit


Inpatient rehab admission?: Yes





- Initial Determination


Are CD services needed?: Yes


Free of communicable disease: Yes


Not in need of hospitalization: Yes





- Rehab Admission Criteria


Previous failed treatment: Yes


Poor recovery environment: Yes


Comorbidities: No


Lacks judgement: No


Patient is meeting Inpatient Rehab admission criteria:: Yes

## 2020-01-17 LAB
ALBUMIN SERPL-MCNC: 4.2 G/DL (ref 3.4–5)
ALP SERPL-CCNC: 68 U/L (ref 45–117)
ALT SERPL-CCNC: 33 U/L (ref 13–61)
ANION GAP SERPL CALC-SCNC: 5 MMOL/L (ref 8–16)
AST SERPL-CCNC: 23 U/L (ref 15–37)
BILIRUB SERPL-MCNC: 0.6 MG/DL (ref 0.2–1)
BUN SERPL-MCNC: 18.4 MG/DL (ref 7–18)
CALCIUM SERPL-MCNC: 9.5 MG/DL (ref 8.5–10.1)
CHLORIDE SERPL-SCNC: 106 MMOL/L (ref 98–107)
CO2 SERPL-SCNC: 28 MMOL/L (ref 21–32)
CREAT SERPL-MCNC: 0.9 MG/DL (ref 0.55–1.3)
DEPRECATED RDW RBC AUTO: 14.8 % (ref 11.9–15.9)
GLUCOSE SERPL-MCNC: 91 MG/DL (ref 74–106)
HCT VFR BLD CALC: 39.8 % (ref 35.4–49)
HGB BLD-MCNC: 13.4 GM/DL (ref 11.7–16.9)
MCH RBC QN AUTO: 28.8 PG (ref 25.7–33.7)
MCHC RBC AUTO-ENTMCNC: 33.7 G/DL (ref 32–35.9)
MCV RBC: 85.4 FL (ref 80–96)
PLATELET # BLD AUTO: 274 K/MM3 (ref 134–434)
PMV BLD: 8.6 FL (ref 7.5–11.1)
POTASSIUM SERPLBLD-SCNC: 4.6 MMOL/L (ref 3.5–5.1)
PROT SERPL-MCNC: 7.5 G/DL (ref 6.4–8.2)
RBC # BLD AUTO: 4.66 M/MM3 (ref 4–5.6)
SODIUM SERPL-SCNC: 139 MMOL/L (ref 136–145)
WBC # BLD AUTO: 4.5 K/MM3 (ref 4–10)

## 2020-01-17 RX ADMIN — Medication SCH MG: at 21:33

## 2020-01-17 RX ADMIN — Medication SCH TAB: at 10:04

## 2020-01-17 RX ADMIN — HYDROXYZINE PAMOATE PRN MG: 50 CAPSULE ORAL at 21:33

## 2020-01-17 RX ADMIN — Medication PRN MG: at 21:33

## 2020-01-17 RX ADMIN — METHOCARBAMOL PRN MG: 500 TABLET ORAL at 16:55

## 2020-01-17 RX ADMIN — HYDROXYZINE PAMOATE PRN MG: 50 CAPSULE ORAL at 10:04

## 2020-01-17 NOTE — PN
BHS Progress Note


Note: 





Pt is a 29 y/o male with a hx of I.V Heroin dependence admitted to rehab on 1/16 /20 through [facility]. Pt reports he is on Vivitrol MAT last dose on 1/24/19 with 

[place], [place] with Suboxone prescriber, Dr. Euceda. Pt 

reports he has been using Heroin for past 2 weeks  before coming here and is 

currently having w/s-hot/cold sweats/chills, no BM x 2-3 days, back pain-hx of 

herniated disc(goes to PT and not on medication for pain). Pt requesting 

medication to alleviate his symptoms. Pt also reports hx of Insomnia and took 

Trazodone. Reports depressed sometimes and requests psych consult. shawnee patrick/h/i 

at this time.


 


 


 Vital Signs - 24 hr











  01/16/20 01/16/20 01/17/20





  14:19 18:09 00:46


 


Temperature 97.7 F 97.8 F 


 


Pulse Rate 94 H 84 


 


Respiratory 18 16 18





Rate   


 


Blood Pressure 114/76 133/73 














  01/17/20 01/17/20





  03:30 07:35


 


Temperature  97.7 F


 


Pulse Rate  69


 


Respiratory 18 18





Rate  


 


Blood Pressure  109/65








Alert o x 3,


nad


oob ambulating with steady gait.


extremities/skin:no edema, skin intact.





A/P


new rehab pt





maintain safety


increase po fluids


motrin prn as directed for pain


robaxin prn as directed for pain


follow up with psych consult.

## 2020-01-18 LAB
APPEARANCE UR: CLEAR
BILIRUB UR STRIP.AUTO-MCNC: NEGATIVE MG/DL
COLOR UR: YELLOW
KETONES UR QL STRIP: NEGATIVE
LEUKOCYTE ESTERASE UR QL STRIP.AUTO: NEGATIVE
NITRITE UR QL STRIP: NEGATIVE
PH UR: 5.5 [PH] (ref 5–8)
PROT UR QL STRIP: NEGATIVE
PROT UR QL STRIP: NEGATIVE
SP GR UR: 1.03 (ref 1.01–1.03)
UROBILINOGEN UR STRIP-MCNC: 0.2 MG/DL (ref 0.2–1)

## 2020-01-18 RX ADMIN — Medication PRN MG: at 21:38

## 2020-01-18 RX ADMIN — Medication SCH MG: at 21:37

## 2020-01-18 RX ADMIN — METHOCARBAMOL PRN MG: 500 TABLET ORAL at 10:50

## 2020-01-18 RX ADMIN — HYDROXYZINE PAMOATE PRN MG: 50 CAPSULE ORAL at 21:37

## 2020-01-18 RX ADMIN — Medication SCH TAB: at 10:49

## 2020-01-19 VITALS — DIASTOLIC BLOOD PRESSURE: 68 MMHG | HEART RATE: 82 BPM | TEMPERATURE: 96.9 F | SYSTOLIC BLOOD PRESSURE: 115 MMHG

## 2020-01-19 RX ADMIN — HYDROXYZINE PAMOATE PRN MG: 50 CAPSULE ORAL at 10:13

## 2020-01-19 RX ADMIN — METHOCARBAMOL PRN MG: 500 TABLET ORAL at 10:13

## 2020-01-19 RX ADMIN — Medication SCH TAB: at 10:11

## 2020-03-09 ENCOUNTER — EMERGENCY (EMERGENCY)
Facility: HOSPITAL | Age: 30
LOS: 1 days | Discharge: HOME | End: 2020-03-09
Attending: EMERGENCY MEDICINE | Admitting: EMERGENCY MEDICINE
Payer: MEDICAID

## 2020-03-09 VITALS
OXYGEN SATURATION: 100 % | SYSTOLIC BLOOD PRESSURE: 139 MMHG | HEART RATE: 95 BPM | DIASTOLIC BLOOD PRESSURE: 97 MMHG | TEMPERATURE: 98 F | RESPIRATION RATE: 16 BRPM

## 2020-03-09 PROCEDURE — 99284 EMERGENCY DEPT VISIT MOD MDM: CPT

## 2020-03-09 NOTE — ED ADULT NURSE NOTE - OBJECTIVE STATEMENT
Pt received to room 18 due to overdose today. AxOx3 and ambulatory at baseline. Pt states he used 1 bag of heroine today and overdosed. Pt states his brother found him overdosed and gave him two doses of Narcan. Pt denies any other alcohol or drug use today. Respirations even and unlabored. NSR on cardiac monitor. Pt denies headache, dizziness, chest pain, SOB, nausea, vomiting, diarrhea, fever, chills and cough at this time. No IV placed at this time. Pt appears to be resting comfortably in bed and in no acute distress at this time. Awaiting MD orders. Pt's belongings placed across from room 21. Will continue to monitor.

## 2020-03-09 NOTE — ED ADULT NURSE NOTE - NSIMPLEMENTINTERV_GEN_ALL_ED
Implemented All Universal Safety Interventions:  [place] to call system. Call bell, personal items and telephone within reach. Instruct patient to call for assistance. Room bathroom lighting operational. Non-slip footwear when patient is off stretcher. Physically safe environment: no spills, clutter or unnecessary equipment. Stretcher in lowest position, wheels locked, appropriate side rails in place.

## 2020-03-09 NOTE — ED ADULT NURSE NOTE - DOES PATIENT HAVE ADVANCE DIRECTIVE
Low symptomatic burden.  Patient prefers to avoid medical treatment at this point, which is reasonable.  No evidence of structural heart disease on echocardiogram and she has had no high risk symptoms.   No

## 2020-03-09 NOTE — ED ADULT TRIAGE NOTE - CHIEF COMPLAINT QUOTE
Pt. overdose on Heroin. family give 2 doses of Narcan and patient was more arousable. Pt. A&O x2 in triage.

## 2020-03-10 VITALS
RESPIRATION RATE: 15 BRPM | DIASTOLIC BLOOD PRESSURE: 68 MMHG | OXYGEN SATURATION: 100 % | TEMPERATURE: 98 F | SYSTOLIC BLOOD PRESSURE: 115 MMHG | HEART RATE: 67 BPM

## 2020-03-10 NOTE — ED PROVIDER NOTE - PATIENT PORTAL LINK FT
You can access the FollowMyHealth Patient Portal offered by [facility] by registering at the following website: [email or web address]. By joining [employer]’s FollowMyHealth portal, you will also be able to view your health information using other applications (apps) compatible with our system.

## 2020-03-10 NOTE — ED PROVIDER NOTE - NSFOLLOWUPINSTRUCTIONS_ED_ALL_ED_FT
You were seen for heroin overdose.     Do not use heroin. It is life threatening as it can cause you to go unconscious and stop breathing.     Follow up with your PCP this week to discuss treatment options to prevent opiod use.     Seek immediate medical assistance for any new or worsening symptoms.    See Referral sheet given to you for substance abuse help.

## 2020-03-10 NOTE — ED PROVIDER NOTE - CLINICAL SUMMARY MEDICAL DECISION MAKING FREE TEXT BOX
unintentional heroin overdose, non suicidal, improved with intranasal narcan. Plan: close observation and monitoring, counseling on substance abuse dangers and treatment options

## 2020-03-10 NOTE — ED PROVIDER NOTE - OBJECTIVE STATEMENT
30M states he snorted heroin tonight at about 8:45pm after not using for several weeks. He passed out and received intranasal narcan by his brother. He states he is awake and has no acute symptoms except slight lethargy. He denies any other substances. He denies he was trying to kill or harm himself. He just wanted to get high.

## 2020-04-01 ENCOUNTER — OUTPATIENT (OUTPATIENT)
Dept: OUTPATIENT SERVICES | Facility: HOSPITAL | Age: 30
LOS: 1 days | End: 2020-04-01
Payer: MEDICAID

## 2020-04-01 PROCEDURE — G9001: CPT

## 2020-04-02 DIAGNOSIS — Z71.89 OTHER SPECIFIED COUNSELING: ICD-10-CM

## 2020-12-31 ENCOUNTER — EMERGENCY (EMERGENCY)
Facility: HOSPITAL | Age: 30
LOS: 1 days | Discharge: HOME | End: 2020-12-31
Attending: PERSONAL EMERGENCY RESPONSE ATTENDANT | Admitting: PERSONAL EMERGENCY RESPONSE ATTENDANT
Payer: MEDICAID

## 2020-12-31 VITALS
OXYGEN SATURATION: 100 % | RESPIRATION RATE: 16 BRPM | SYSTOLIC BLOOD PRESSURE: 134 MMHG | DIASTOLIC BLOOD PRESSURE: 87 MMHG | HEART RATE: 95 BPM | TEMPERATURE: 98 F

## 2020-12-31 PROCEDURE — 99283 EMERGENCY DEPT VISIT LOW MDM: CPT

## 2020-12-31 RX ORDER — ACETAMINOPHEN 500 MG
975 TABLET ORAL ONCE
Refills: 0 | Status: COMPLETED | OUTPATIENT
Start: 2020-12-31 | End: 2020-12-31

## 2020-12-31 NOTE — ED ADULT TRIAGE NOTE - CHIEF COMPLAINT QUOTE
Patient found unresponsive in a room by his family for possible overdose around 2155.  When EMS arrived at 2202, patient not responsive, pupils pin point and was given 2 does of Narcan.  Patient now responsive and complaining of headache.  States, he does know what he took, possible heroin.  History of overdose.

## 2021-01-01 VITALS
RESPIRATION RATE: 18 BRPM | TEMPERATURE: 98 F | OXYGEN SATURATION: 99 % | SYSTOLIC BLOOD PRESSURE: 126 MMHG | HEART RATE: 74 BPM | DIASTOLIC BLOOD PRESSURE: 77 MMHG

## 2021-01-01 RX ADMIN — Medication 975 MILLIGRAM(S): at 00:34

## 2021-01-01 NOTE — ED PROVIDER NOTE - PROGRESS NOTE DETAILS
[facility] EM/IM PGY3: Pt reassessed, A&Ox3, feeling well at this time. Vital signs stable. O2 sat 100% on RA. Father is available to pick patient up from ED. Will dc home with strict return precautions and substance abuse referral. Pt verbalized understanding of and agrees with this assessment and plan.

## 2021-01-01 NOTE — ED PROVIDER NOTE - NSFOLLOWUPINSTRUCTIONS_ED_ALL_ED_FT
You were seen in the ER for heroin overdose. We observed you for several hours and you did not have further symptoms of overdose during this time.     Do not use heroin. It is life threatening as it can cause you to go unconscious and stop breathing.     Follow up with your PCP this week to discuss treatment options to prevent opiod use.     Return to the ER immediately for any fevers/chills, difficulty breathing, chest pain, nausea/vomiting/inability to eat, or any other new or worsening symptoms.    We have also provided a referral for our substance abuse center who you can contact for further support with abstaining from drug use as you are motivated to do so.

## 2021-01-01 NOTE — ED PROVIDER NOTE - PATIENT PORTAL LINK FT
You can access the FollowMyHealth Patient Portal offered by [facility] by registering at the following website: [email or web address]. By joining [employer]’s FollowMyHealth portal, you will also be able to view your health information using other applications (apps) compatible with our system.

## 2021-01-01 NOTE — ED PROVIDER NOTE - OBJECTIVE STATEMENT
Attending MD Montero.  Pt is a 31 yo male with hx of heroin abuse who has been sober for 2 mos following rehab for heroin and this evening states he snorted 1/2 bag heroin because he 'had some' at ~2155.  When EMS arrived pt was unresponsive with pinpoint pupils and was dosed 2 doses of narcan.  Pt arrives to ED responsive endorsing headache.  Per triage wasn't able to state what he took but on arrival to ED proper states he used heroin and supposes that his father called EMS. Pt denies attempt at self harm.

## 2021-01-01 NOTE — ED PROVIDER NOTE - ATTENDING CONTRIBUTION TO CARE
Attending MD Montero.  PT seen by me and initial HPI authored by me and pt hemodynamically stable, well appearing, A & O x 4 being observed at time of signout to incoming team.  Pt aware that he will be observed 2/2 halflife of narcan and will likely be discharged.  Pt states that he has narcan at home and lives with dad who is his support system.  Pt embarassed that he used and does not feel he requires rehab again but plans to abstain. Attending MD Montero.  PT seen by me and initial HPI authored by me and pt hemodynamically stable, well appearing, A & O x 4 being observed at time of signout to incoming team.  Pt aware that he will be observed 2/2 halflife of narcan and will likely be discharged.  Pt states that he has narcan at home and lives with dad who is his support system.  Pt embarassed that he used and does not feel he requires rehab again but plans to abstain.  Pt extensively educated re: options to return to sobriety.  Pt stable at time of signout to incoming team pending observation and reassessment.

## 2021-01-01 NOTE — ED PROVIDER NOTE - NSFOLLOWUPCLINICS_GEN_ALL_ED_FT
[facility] Psych Dept of Substance Abuse  Psychiatry Substance Abuse  [address] [place] [address]  Phone: [phone]  Fax:   Follow Up Time: 4-6 Days

## 2021-01-01 NOTE — ED PROVIDER NOTE - CLINICAL SUMMARY MEDICAL DECISION MAKING FREE TEXT BOX
Attending MD Montero. Pt received narcan by EMS.  Plan to monitor and observe for 4 hours from narcan dosing ~2215.  Pt to be reassessed at ~0215 and likely d/c if no return of sxs c/w opiate OD.  Pt has not endorsed SI to EMS or ED.  Stable at time of signout.

## 2021-02-01 ENCOUNTER — OUTPATIENT (OUTPATIENT)
Dept: OUTPATIENT SERVICES | Facility: HOSPITAL | Age: 31
LOS: 1 days | End: 2021-02-01
Payer: MEDICAID

## 2021-02-01 PROCEDURE — G9005: CPT

## 2021-02-05 NOTE — ED PROVIDER NOTE - NS ED MD DISPO DISCHARGE CCDA
[address] [facility]     21  Franc Crisostomo : 1935 Sex: female  Age: 79 y.o. Chief Complaint   Patient presents with    Other     spot on stomach; noticed this afternoon; been having a sharp pain on her right side, hip and down the leg;        HPI    Patient presents today to express care complaining of a lesion to her right abdomen. Noticed it this morning in the mirror. States it does not hurt and does not itch. Looks to run along her pant line. She does not recall any injury. No lesions elsewhere. Review of Systems   Constitutional: Negative for chills and fever. Skin: Positive for wound. See above              REST OF PERTINENT ROS GONE OVER AND WAS NEGATIVE.                Current Outpatient Medications:     levothyroxine (SYNTHROID) 50 MCG tablet, Take 1 tablet by mouth daily, Disp: 90 tablet, Rfl: 1    loratadine (CLARITIN) 10 MG tablet, Take 10 mg by mouth daily as needed (allergies) , Disp: , Rfl:     aspirin 81 MG tablet, Take 81 mg by mouth daily, Disp: , Rfl:   Allergies   Allergen Reactions    Carafate [Sucralfate]      Unable to explain    Cefdinir     Cetirizine & Related      Unable to explain    Lisinopril Other (See Comments)     Difficulty swallowing, reflux, headache    Pravachol [Pravastatin Sodium]      Unable to explain    Protonix [Pantoprazole Sodium]      Unable to explain    Statins Other (See Comments)     intolerable       Past Medical History:   Diagnosis Date    Dementia ([facility] [address]     Diverticulosis     GIST (gastrointestinal stroma tumor), malignant, colon ([address]     Hyperlipidemia     Hypertension     MI (myocardial infarction) ([facility] [address]     Mitral regurgitation     per cardiology    PUD (peptic ulcer disease)      Past Surgical History:   Procedure Laterality Date    CAROTID ENDARTERECTOMY Right 2019    Dr Genevieve Prasad Bilateral 2015    COLONOSCOPY  2014    DILATATION, ESOPHAGUS      ENDOSCOPY, COLON, DIAGNOSTIC  2016    marking of gastric tumor    HYSTERECTOMY      YUMIKO/BSO    OTHER SURGICAL HISTORY  2016    Laparoscopic Robotic Assisted Partial Gastrectomy    SHOULDER SURGERY Left     rotator cuff right    TONSILLECTOMY AND ADENOIDECTOMY      UPPER GASTROINTESTINAL ENDOSCOPY  2014    UPPER GASTROINTESTINAL ENDOSCOPY  2016     Family History   Problem Relation Age of Onset    Colon Cancer Father     COPD Mother         tobacco    Cancer Sister         breast     Social History     Socioeconomic History    Marital status:      Spouse name: Not on file    Number of children: Not on file    Years of education: Not on file    Highest education level: Not on file   Occupational History    Occupation: retired    Social Needs    Financial resource strain: Not on file    Food insecurity     Worry: Not on file     Inability: Not on file   [facility] needs     Medical: Not on file     Non-medical: Not on file   Tobacco Use    Smoking status: Former Smoker     Packs/day: 1.00     Years: 18.00     Pack years: 18.00     Quit date:      Years since quittin.1    Smokeless tobacco: Never Used   Substance and Sexual Activity    Alcohol use: Yes     Comment: glass of wine with dinner    Drug use: No    Sexual activity: Not Currently     Partners: Male     Comment:    Lifestyle    Physical activity     Days per week: Not on file     Minutes per session: Not on file    Stress: Not on file   Relationships    Social connections     Talks on phone: Not on file     Gets together: Not on file     Attends Anabaptist service: Not on file     Active member of club or organization: Not on file     Attends meetings of clubs or organizations: Not on file     Relationship status: Not on file    Intimate partner violence     Fear of current or ex partner: Not on file     Emotionally abused: Not on file     Physically abused: Not on file     Forced sexual activity: Not on file   Other Topics Concern    Not on file   Social History Narrative    Not on file       Vitals:    02/05/21 1632   BP: (!) 174/92   Pulse: 83   Temp: 97 °F (36.1 °C)   TempSrc: Temporal   SpO2: 94%   Weight: 146 lb (66.2 kg)   Height: 5' 6\" (1.676 m)       Physical Exam  Constitutional:       General: She is not in acute distress. Skin:     General: Skin is warm and dry. Findings: Lesion present. Neurological:      Mental Status: She is alert and oriented to person, place, and time. Psychiatric:         Mood and Affect: Mood normal.         Behavior: Behavior normal.         Thought Content: Thought content normal.         Judgment: Judgment normal.         Assessment and Plan:  Stephanie Peace was seen today for other. Diagnoses and all orders for this visit:    Abrasion of abdominal wall, initial encounter      Follow-up with PCP. Vascular prescription Polysporin applied couple times a day for a few days. If not improving to let us know. After the visit I realized I did not see her blood pressure which was high. We will have the girls call and have her check it closely over the next few days. No follow-ups on file. Seen By:  Herlinda Rodriguez MD      *Document was created using voice recognition software. Note was reviewed however may contain grammatical errors. Patient/Caregiver provided printed discharge information.

## 2021-02-12 ENCOUNTER — INPATIENT (INPATIENT)
Facility: HOSPITAL | Age: 31
LOS: 2 days | Discharge: LEFT BEFORE DISCHARGE | DRG: 894 | End: 2021-02-15
Attending: HOSPITALIST | Admitting: HOSPITALIST
Payer: MEDICAID

## 2021-02-12 VITALS
SYSTOLIC BLOOD PRESSURE: 106 MMHG | HEART RATE: 129 BPM | DIASTOLIC BLOOD PRESSURE: 77 MMHG | WEIGHT: 210.1 LBS | RESPIRATION RATE: 20 BRPM | OXYGEN SATURATION: 96 % | TEMPERATURE: 99 F | HEIGHT: 71 IN

## 2021-02-12 DIAGNOSIS — R65.10 SYSTEMIC INFLAMMATORY RESPONSE SYNDROME (SIRS) OF NON-INFECTIOUS ORIGIN WITHOUT ACUTE ORGAN DYSFUNCTION: ICD-10-CM

## 2021-02-12 DIAGNOSIS — F11.23 OPIOID DEPENDENCE WITH WITHDRAWAL: ICD-10-CM

## 2021-02-12 DIAGNOSIS — F11.10 OPIOID ABUSE, UNCOMPLICATED: ICD-10-CM

## 2021-02-12 DIAGNOSIS — F10.230 ALCOHOL DEPENDENCE WITH WITHDRAWAL, UNCOMPLICATED: ICD-10-CM

## 2021-02-12 DIAGNOSIS — F10.239 ALCOHOL DEPENDENCE WITH WITHDRAWAL, UNSPECIFIED: ICD-10-CM

## 2021-02-12 LAB
ALBUMIN SERPL ELPH-MCNC: 4.3 G/DL — SIGNIFICANT CHANGE UP (ref 3.3–5)
ALP SERPL-CCNC: 107 U/L — SIGNIFICANT CHANGE UP (ref 40–120)
ALT FLD-CCNC: 26 U/L — SIGNIFICANT CHANGE UP (ref 10–45)
ANION GAP SERPL CALC-SCNC: 15 MMOL/L — SIGNIFICANT CHANGE UP (ref 5–17)
AST SERPL-CCNC: 24 U/L — SIGNIFICANT CHANGE UP (ref 10–40)
BASOPHILS # BLD AUTO: 0.04 K/UL — SIGNIFICANT CHANGE UP (ref 0–0.2)
BASOPHILS NFR BLD AUTO: 0.6 % — SIGNIFICANT CHANGE UP (ref 0–2)
BILIRUB SERPL-MCNC: 0.4 MG/DL — SIGNIFICANT CHANGE UP (ref 0.2–1.2)
BUN SERPL-MCNC: 12 MG/DL — SIGNIFICANT CHANGE UP (ref 7–23)
CALCIUM SERPL-MCNC: 9.5 MG/DL — SIGNIFICANT CHANGE UP (ref 8.4–10.5)
CHLORIDE SERPL-SCNC: 106 MMOL/L — SIGNIFICANT CHANGE UP (ref 96–108)
CO2 SERPL-SCNC: 19 MMOL/L — LOW (ref 22–31)
CREAT SERPL-MCNC: 0.82 MG/DL — SIGNIFICANT CHANGE UP (ref 0.5–1.3)
EOSINOPHIL # BLD AUTO: 0.1 K/UL — SIGNIFICANT CHANGE UP (ref 0–0.5)
EOSINOPHIL NFR BLD AUTO: 1.4 % — SIGNIFICANT CHANGE UP (ref 0–6)
GLUCOSE SERPL-MCNC: 79 MG/DL — SIGNIFICANT CHANGE UP (ref 70–99)
HCT VFR BLD CALC: 44.6 % — SIGNIFICANT CHANGE UP (ref 39–50)
HGB BLD-MCNC: 15.4 G/DL — SIGNIFICANT CHANGE UP (ref 13–17)
IMM GRANULOCYTES NFR BLD AUTO: 0.3 % — SIGNIFICANT CHANGE UP (ref 0–1.5)
LYMPHOCYTES # BLD AUTO: 2.53 K/UL — SIGNIFICANT CHANGE UP (ref 1–3.3)
LYMPHOCYTES # BLD AUTO: 35.5 % — SIGNIFICANT CHANGE UP (ref 13–44)
MCHC RBC-ENTMCNC: 27.9 PG — SIGNIFICANT CHANGE UP (ref 27–34)
MCHC RBC-ENTMCNC: 34.5 GM/DL — SIGNIFICANT CHANGE UP (ref 32–36)
MCV RBC AUTO: 80.8 FL — SIGNIFICANT CHANGE UP (ref 80–100)
MONOCYTES # BLD AUTO: 0.52 K/UL — SIGNIFICANT CHANGE UP (ref 0–0.9)
MONOCYTES NFR BLD AUTO: 7.3 % — SIGNIFICANT CHANGE UP (ref 2–14)
NEUTROPHILS # BLD AUTO: 3.91 K/UL — SIGNIFICANT CHANGE UP (ref 1.8–7.4)
NEUTROPHILS NFR BLD AUTO: 54.9 % — SIGNIFICANT CHANGE UP (ref 43–77)
NRBC # BLD: 0 /100 WBCS — SIGNIFICANT CHANGE UP (ref 0–0)
PLATELET # BLD AUTO: 365 K/UL — SIGNIFICANT CHANGE UP (ref 150–400)
POTASSIUM SERPL-MCNC: 4.6 MMOL/L — SIGNIFICANT CHANGE UP (ref 3.5–5.3)
POTASSIUM SERPL-SCNC: 4.6 MMOL/L — SIGNIFICANT CHANGE UP (ref 3.5–5.3)
PROT SERPL-MCNC: 7.1 G/DL — SIGNIFICANT CHANGE UP (ref 6–8.3)
RBC # BLD: 5.52 M/UL — SIGNIFICANT CHANGE UP (ref 4.2–5.8)
RBC # FLD: 14.5 % — SIGNIFICANT CHANGE UP (ref 10.3–14.5)
SARS-COV-2 RNA SPEC QL NAA+PROBE: SIGNIFICANT CHANGE UP
SODIUM SERPL-SCNC: 140 MMOL/L — SIGNIFICANT CHANGE UP (ref 135–145)
WBC # BLD: 7.12 K/UL — SIGNIFICANT CHANGE UP (ref 3.8–10.5)
WBC # FLD AUTO: 7.12 K/UL — SIGNIFICANT CHANGE UP (ref 3.8–10.5)

## 2021-02-12 PROCEDURE — 99223 1ST HOSP IP/OBS HIGH 75: CPT

## 2021-02-12 PROCEDURE — 99285 EMERGENCY DEPT VISIT HI MDM: CPT

## 2021-02-12 PROCEDURE — 90792 PSYCH DIAG EVAL W/MED SRVCS: CPT

## 2021-02-12 RX ORDER — THIAMINE MONONITRATE (VIT B1) 100 MG
100 TABLET ORAL ONCE
Refills: 0 | Status: COMPLETED | OUTPATIENT
Start: 2021-02-12 | End: 2021-02-13

## 2021-02-12 RX ORDER — ONDANSETRON 8 MG/1
4 TABLET, FILM COATED ORAL ONCE
Refills: 0 | Status: COMPLETED | OUTPATIENT
Start: 2021-02-12 | End: 2021-02-12

## 2021-02-12 RX ORDER — ONDANSETRON 8 MG/1
4 TABLET, FILM COATED ORAL EVERY 6 HOURS
Refills: 0 | Status: DISCONTINUED | OUTPATIENT
Start: 2021-02-12 | End: 2021-02-15

## 2021-02-12 RX ORDER — KETOROLAC TROMETHAMINE 30 MG/ML
15 SYRINGE (ML) INJECTION ONCE
Refills: 0 | Status: DISCONTINUED | OUTPATIENT
Start: 2021-02-12 | End: 2021-02-12

## 2021-02-12 RX ORDER — SODIUM CHLORIDE 9 MG/ML
1000 INJECTION INTRAMUSCULAR; INTRAVENOUS; SUBCUTANEOUS
Refills: 0 | Status: COMPLETED | OUTPATIENT
Start: 2021-02-12 | End: 2021-02-13

## 2021-02-12 RX ORDER — METHADONE HYDROCHLORIDE 40 MG/1
15 TABLET ORAL ONCE
Refills: 0 | Status: DISCONTINUED | OUTPATIENT
Start: 2021-02-12 | End: 2021-02-12

## 2021-02-12 RX ORDER — FOLIC ACID 0.8 MG
1 TABLET ORAL DAILY
Refills: 0 | Status: DISCONTINUED | OUTPATIENT
Start: 2021-02-12 | End: 2021-02-15

## 2021-02-12 RX ORDER — METHADONE HYDROCHLORIDE 40 MG/1
14 TABLET ORAL EVERY 8 HOURS
Refills: 0 | Status: DISCONTINUED | OUTPATIENT
Start: 2021-02-12 | End: 2021-02-13

## 2021-02-12 RX ORDER — SODIUM CHLORIDE 9 MG/ML
1000 INJECTION INTRAMUSCULAR; INTRAVENOUS; SUBCUTANEOUS ONCE
Refills: 0 | Status: COMPLETED | OUTPATIENT
Start: 2021-02-12 | End: 2021-02-12

## 2021-02-12 RX ADMIN — Medication 15 MILLIGRAM(S): at 20:22

## 2021-02-12 RX ADMIN — Medication 50 MILLIGRAM(S): at 22:45

## 2021-02-12 RX ADMIN — Medication 25 MILLIGRAM(S): at 16:28

## 2021-02-12 RX ADMIN — Medication 2 MILLIGRAM(S): at 15:19

## 2021-02-12 RX ADMIN — METHADONE HYDROCHLORIDE 15 MILLIGRAM(S): 40 TABLET ORAL at 17:36

## 2021-02-12 RX ADMIN — METHADONE HYDROCHLORIDE 15 MILLIGRAM(S): 40 TABLET ORAL at 15:24

## 2021-02-12 NOTE — BEHAVIORAL HEALTH ASSESSMENT NOTE - DESCRIPTION (FIRST USE, LAST USE, QUANTITY, FREQUENCY, DURATION)
drinks 5 whiskeys per day smoked 15 bags of heroin daily took unknown amount of methadone last night from his drug dealer

## 2021-02-12 NOTE — H&P ADULT - NSHPLABSRESULTS_GEN_ALL_CORE
Personally reviewed old records.  Personally reviewed labs.  Personally reviewed imaging.                        15.4   7.12  )-----------( 365      ( 12 Feb 2021 14:37 )             44.6       02-12    140  |  106  |  12  ----------------------------<  79  4.6   |  19<L>  |  0.82    Ca    9.5      12 Feb 2021 14:37    TPro  7.1  /  Alb  4.3  /  TBili  0.4  /  DBili  x   /  AST  24  /  ALT  26  /  AlkPhos  107  02-12            LIVER FUNCTIONS - ( 12 Feb 2021 14:37 )  Alb: 4.3 g/dL / Pro: 7.1 g/dL / ALK PHOS: 107 U/L / ALT: 26 U/L / AST: 24 U/L / GGT: x
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
## 2021-02-12 NOTE — BEHAVIORAL HEALTH ASSESSMENT NOTE - VIOLENCE PROTECTIVE FACTORS:
Residential stability/Relationship stability/Insight into violence risk and need for management/treatment

## 2021-02-12 NOTE — ED PROVIDER NOTE - NS ED ROS FT
REVIEW OF SYSTEMS:    CONSTITUTIONAL: No weakness, no fevers  EYES/ENT: No visual changes   RESPIRATORY: No cough, no wheezing, no shortness of breath  CARDIOVASCULAR: No chest pain, no palpitations  GASTROINTESTINAL: +abdominal pain, no nausea, +vomiting, +diarrhea, no constipation  NEUROLOGICAL: No numbness, no weakness, no loss of consciousness, normal movement, +back pain  SKIN: No itching, no lesions, no rashes  PSYCH: no anxiety, no tremor, no sweating, oriented

## 2021-02-12 NOTE — H&P ADULT - PROBLEM SELECTOR PLAN 1
- seen by psych  - SW to see in AM to provide detox resources  - cont methadone 14q8h as per psych recs  - otherwise symptomatic relief

## 2021-02-12 NOTE — H&P ADULT - PROBLEM SELECTOR PLAN 2
- suspect binge type etoh use to self-medicate  - will cont high risk CIWA librium taper as per psych recs, which I suspect can be switched to low risk  - thiamine, b12, mvt

## 2021-02-12 NOTE — ED PROVIDER NOTE - PHYSICAL EXAMINATION
PHYSICAL EXAM:  GENERAL: NAD, well-groomed, well-developed  EYES: EOMI, vision grossly intact, conjunctiva and sclera clear, pupil size is normal and no mydriasis or miosis  ENMT: Hearing grossly intact, Moist mucous membranes, no yawning observed in 15 min interview, no lacrimation, no runny nose visible  NECK: Supple, no cervical LAD  CHEST/LUNG: Clear to auscultation bilaterally; No crackles, rhonchi, wheezing, or rubs  HEART: Regular rate and rhythm; No murmurs, rubs, or gallops  ABDOMEN: Soft, Nontender, Nondistended; Bowel sounds present, no HSM  EXTREMITIES:  2+ Peripheral Pulses, No clubbing, cyanosis, or peripheral edema. No erythema or calf tenderness  SKIN: No rashes or lesions, skin is warm dry and intact  NERVOUS SYSTEM:  Alert & Oriented X3, Good concentration; No focal motor or sensory deficits, no agitation, no anxiety mood, pleasant affect and cooperative

## 2021-02-12 NOTE — BEHAVIORAL HEALTH ASSESSMENT NOTE - NSBHCONSULTSUBSTANCEOPIATES_PSY_A_CORE FT
monitor CINA/COWS for opiate withdrawal sx- Use methadone 15mg q8 PRN for opiate withdrawal sx (piloerection, rhinorrhea, nasal congestion, myalgias, emesis, diarrhea, abdominal pain) Methodone 15mg po q8hrs prn or standing if pt requires three doses daily. monitor CINA/COWS for opiate withdrawal sx- Use methadone 15mg q8 PRN for opiate withdrawal sx (piloerection, rhinorrhea, nasal congestion, myalgias, emesis, diarrhea, abdominal pain)

## 2021-02-12 NOTE — ED PROVIDER NOTE - OBJECTIVE STATEMENT
30 yo man HX of daily heroin use presents to ED for heroin withdrawal symptoms. He uses heroin daily average 15 bags a day for 4 months, almost always smoking or snorting and rarely via intravenous injection, last used heroin at 4:30am on 2/11. Last night, he gave himself methadone. This morning, he started to feel abdominal pain, back pain, cold sweat, goosebump, chills, and diarrhea x few episodes. He reports no chest pain, no SOB, no anxiety, no tremor, no yawning, no runny nose.     Patient would like to get detox and eventually go to rehab to stop using heroin. He lives alone at home and states that he feels safe at home. 32 yo man HX of daily heroin use presents to ED for heroin withdrawal symptoms. He uses heroin daily average 15 bags a day for 4 months, almost always smoking or snorting and rarely via intravenous injection, last used heroin at 4:30am on 2/11. Last night, he gave himself methadone which he bought from his dealer. This morning, he started to feel abdominal pain, back pain, cold sweat, goosebump, chills, and diarrhea x few episodes. He reports no chest pain, no SOB, no anxiety, no tremor, no yawning, no runny nose.     Patient would like to get detox and eventually go to rehab to stop using heroin. He lives alone at home and states that he feels safe at home.

## 2021-02-12 NOTE — BEHAVIORAL HEALTH ASSESSMENT NOTE - RISK ASSESSMENT
Risk factors: active substance abuse, recent loss, noncompliant with treatment, not receiving treatment    Protective factors: no current SIIP/HIIP, no h/o SA/SIB, no h/o psych admissions, good physical health, no psychosis, domiciled, social supports, help-seeking behaviors    Overall, pt is a chronic high risk due to active substance abuse which can impair insight and judgment however at this time is an acute low risk of harm to self/others and does not require psychiatric admission for safety and stabilization. Low Acute Suicide Risk

## 2021-02-12 NOTE — ED PROVIDER NOTE - ATTENDING CONTRIBUTION TO CARE
30 y/o m with pmhx polysub abuse, current tobacco smoker, drinks alcohol once per week, and over the last 3 months has been using heroin approx 5-15 bags per day. Now wants to stop using and came for withdrawal. used methadone last night he obtained thru his dealer.  started having abd pain and cold sweats. no chest pain. no yawning. diarrhea.   Gen.  anxious but no resp or acute distress  HEENT:  perrl eomi  Lungs:  b/l bs  CVS: S1S2   Abd;  soft non tender no distention no guarding  Ext: no pitting edema  Neuro: aaox3 no focal deficits  MSK: strength 5/5 b/l upper and lower ext.

## 2021-02-12 NOTE — BEHAVIORAL HEALTH ASSESSMENT NOTE - SUMMARY
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 Psychiatry consulted to assess for heroin withdrawal, detox, alcohol withdrawal.  Patient seen and evaluated, reports using heroin 15 bags daily, for the past 3 months, also states has been drinking whiskey (5drinks) per day.  On evaluation patient noted to be excessively yawning, restless, feels "I'm jumping out of my skin."  Patient noted to be tachycardic up to 130's, denies that he has any h/o withdrawal seizures from alcohol.  Patient reports motivation to start MMTP, was counseled on Suboxone vs methadone, given information on clinic in [place] and in [place].  Also counseled on consequences of continued use of substances such as accidental overdose, importance of ensuring patient with access to Narcan, importance of patient informing family members of symptoms concerning for overdose.  Patient agrees to be admitted to the hospital alcohol detox and management of opiate withdrawal sx.  At this time, would consider starting standing Librium taper for alcohol withdrawal.  Would also consider using methadone PRN for opiate withdrawal sx.  See below for recommendations. 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 Psychiatry consulted to assess for heroin withdrawal, detox, alcohol withdrawal.  Patient seen and evaluated, reports using heroin 15 bags daily, for the past 3 months, also states has been drinking whiskey (5drinks) per day for the past few days to help with the opiate withdrawal.  On evaluation patient noted to be excessively yawning, restless, feels "I'm jumping out of my skin."  Patient noted to be tachycardic up to 130's, denies that he has any h/o withdrawal seizures from alcohol.  Patient reports motivation to start MMTP, was counseled on Suboxone vs methadone, given information on clinic in [place] and in [place].  Also counseled on consequences of continued use of substances such as accidental overdose, importance of ensuring patient with access to Narcan, importance of patient informing family members of symptoms concerning for overdose.  Patient agrees to be admitted to the hospital alcohol detox and management of opiate withdrawal sx.  At this time, would consider starting standing Librium taper for alcohol withdrawal.  Would also consider using methadone PRN for opiate withdrawal sx.  See below for recommendations.

## 2021-02-12 NOTE — CHART NOTE - NSCHARTNOTEFT_GEN_A_CORE
EMERGENCY : LMSW consulted by Attending MD for patient coming into ED for withdrawal symptoms s/p heroine misuse. Dispo pending ED course. LMSW reviewed patient's chart. Previous notes read and appreciated. LMSW met with patient at bedside and introduced self and role to which he verbalized understanding. LMSW made an attempt to assess patient for positive SBIRT screen and discuss various treatment options should he be cleared for discharge by doctors today. Patient appearing very anxious and fixated on receiving methadone in the ED. He states he got methadone last night from a dealer and is upset that the doctors are recommending Suboxone. Patient stating he wants to AMA out of the hospital and go home. Attending MD made aware. Patient did take resources for both inpatient, outpatient and detox programs and states he is receptive to speaking with telephonic SBIRT counselor over the phone. Patient requesting to speak to doctor about receiving methadone. LMSW contacted Ayesha from the telephonic SBIRT line to provide update. She states she will reach out to patient via telephone to explore follow up treatment options with patient should he be cleared for discharge today. Psychiatry also at bedside speaking with patient about various treatment options. Attending Psychiatrist Dr. Olivares states she informed patient about a methadone clinic that he may be able to attend in [facility] (PH: [phone]) but that they are closed right now and an appointment cannot be secured at this time. LMSW also made MD aware that follow up appointments may not be guaranteed or secured at this time due to it being later in the day. LMSW then notified at this time that patient is going to be admitted to medicine for withdrawal symptoms and that he is now endorsing alcohol use in addition heroine use. LMKATY made Ayesha from SBIRT aware who states that the telephonic SBIRT line will continue to work with patient for discharge follow up need. LMSW also providing handoff to weekend lead social workers as well as [facility] social workers for follow up and assistance with discharge plans/ treatment options that may be available based off of patient's hospital course. Patient remains acute and social work to continue to follow. Ongoing social work availability ensured and social work remains available for any further needs.

## 2021-02-12 NOTE — BEHAVIORAL HEALTH ASSESSMENT NOTE - CASE SUMMARY
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 Psychiatry consulted to assess for heroin withdrawal, detox, alcohol withdrawal.  Patient seen and evaluated, reports using heroin 15 bags daily, for the past 3 months, also states has been drinking whiskey (5drinks) per day for the past few days to help with the opiate withdrawal.  On evaluation patient noted to be excessively yawning, restless, feels "I'm jumping out of my skin."  Patient noted to be tachycardic up to 130's, denies that he has any h/o withdrawal seizures from alcohol.  Patient reports motivation to start MMTP, was counseled on Suboxone vs methadone, given information on clinic in [place] and in [place].  Also counseled on consequences of continued use of substances such as accidental overdose, importance of ensuring patient with access to Narcan, importance of patient informing family members of symptoms concerning for overdose.  Patient agrees to be admitted to the hospital alcohol detox and management of opiate withdrawal sx. I agree with starting a CIWA protocol with a Librium  taper and PRN Ativan and Methadone 14mg q8hrs for his opiate withdrawal.

## 2021-02-12 NOTE — BEHAVIORAL HEALTH ASSESSMENT NOTE - NSBHCHARTREVIEWLAB_PSY_A_CORE FT
15.4   7.12  )-----------( 365      ( 12 Feb 2021 14:37 )             44.6   02-12    140  |  106  |  12  ----------------------------<  79  4.6   |  19<L>  |  0.82    Ca    9.5      12 Feb 2021 14:37    TPro  7.1  /  Alb  4.3  /  TBili  0.4  /  DBili  x   /  AST  24  /  ALT  26  /  AlkPhos  107  02-12

## 2021-02-12 NOTE — ED ADULT NURSE NOTE - OBJECTIVE STATEMENT
31 yr old male with no PMH coming in to detox from heroin use. Pt states that he has been using heroin for 4 months, mostly smokes it, occasionally snorts it and very seldom injects it. Pt states that the last time he smoked heroin was yesterday morning and 31 yr old male with no PMH coming in to detox from heroin use. Pt states that he has been using heroin for 4 months, mostly smokes it, occasionally snorts it and very seldom injects it. Pt states that the last time he smoked heroin was yesterday morning and he took methadone last night. Pt states that he wants to try to stop heroin and get help. Pt states that he smokes cigarettes every day and drinks about once week. Pt states he smokes about 14 bags of heroin daily. Pt denies any other drug use. PT sates he is nauseous and having abdominal pain. Pt reports having chills and sweats. Pt denies any CP, SOB or fever at current time. Social work consulted. VSS. Pt A&Ox4. Skin is intact and normal for race. Safety maintained. Will continue to monitor.

## 2021-02-12 NOTE — ED PROVIDER NOTE - CLINICAL SUMMARY MEDICAL DECISION MAKING FREE TEXT BOX
30 yo man daily heroin use now presents to ED with heroin withdrawal symptoms, including abdominal pain and back pain. Vitals stable except for HR 120s. Physical exam is normal. Patient is cooperative and pleasant, alert and oriented x 3. He is interested in detox and hopefully rehab to stop using heroin. Low suspicion for ingestion of other substance. Will order basic labs, administer methadone 1 time, and consult social work. 32 yo man daily heroin use now presents to ED with heroin withdrawal symptoms, including abdominal pain and back pain. Vitals stable except for HR 120s. Physical exam is normal. Patient is cooperative and pleasant, alert and oriented x 3. He is interested in detox and hopefully rehab to stop using heroin. Low suspicion for ingestion of other substance. Will order basic labs, administer buprenorphine-naloxone 1 time, and consult social work to connect to outpatient heroin use treatment. Plan to dispo to home. 32 yo man daily heroin use now presents to ED with heroin withdrawal symptoms, including abdominal pain and back pain. Vitals stable except for HR 120s. Physical exam is normal. Patient is cooperative and pleasant, alert and oriented x 3. He is interested in detox and hopefully rehab to stop using heroin. Low suspicion for ingestion of other substance. Will order basic labs, administer buprenorphine-naloxone 1 time, and consult social work to connect to outpatient heroin use treatment. Plan to dispo to home.  ATTG: : heroin abuse, check labs, po hydration, offer NAL SAT program - psych eval for Suboxone outpt vs methadone outpt. patient states he recently went to [facility] and did not succeed at that site and prefers alternate site for outpt program. 32 yo man daily heroin use now presents to ED with heroin withdrawal symptoms, including abdominal pain and back pain. Vitals stable except for HR 120s. Physical exam is normal. Patient is cooperative and pleasant, alert and oriented x 3. He is interested in detox and hopefully rehab to stop using heroin. Low suspicion for ingestion of other substance. Will order basic labs, administer methadone 1 time, and consult social work to connect to outpatient heroin use treatment. Plan to dispo to home.  ATTG: : heroin abuse, check labs, po hydration, offer NAL SAT program - psych eval for Suboxone outpt vs methadone outpt. patient states he recently went to [facility] and did not succeed at that site and prefers alternate site for outpt program.

## 2021-02-12 NOTE — H&P ADULT - NSHPREVIEWOFSYSTEMS_GEN_ALL_CORE
REVIEW OF SYSTEMS:  CONSTITUTIONAL: No weakness. No fevers. +chills. +sweats. No poor appetite.  EYES: No blurry or double vision. No eye pain.  ENT: No hearing difficulty. No vertigo. No dysphagia. No sore throat. No Sinusitis/rhinorrhea.   NECK: No pain. No stiffness/rigidity.  CARDIAC: No chest pain. No palpitations. No lightheadedness. No syncope.  RESPIRATORY: No cough. No SOB. No hemoptysis.  GASTROINTESTINAL: +abdominal pain. +nausea. +vomiting. No hematemesis. +diarrhea. No constipation. No melena. No hematochezia.  GENITOURINARY: No dysuria. No frequency. No hesitancy. No hematuria. No oliguria.  NEUROLOGICAL: No numbness/tingling. No focal weakness. No urinary or fecal incontinence. No headache. No unsteady gait.  BACK: +back pain. No flank pain.  EXTREMITIES: No lower extremity edema. Full ROM.   SKIN: No rashes. No itching. No other lesions.  PSYCHIATRIC: No depression. +anxiety. No SI/HI.  ALLERGIC: No lip swelling. No hives.  All other review of systems is negative unless indicated above.  Unless indicated above, unable to assess ROS 2/2

## 2021-02-12 NOTE — ED ADULT NURSE REASSESSMENT NOTE - NS ED NURSE REASSESS COMMENT FT1
Report received from Angella BISHOP.  [id number]: Pt. walking around stretcher, does not appear to be in any acute distress. Pt. reports improvement in symptoms since arrival to [facility], but reports he still feels chills and back pain at this time. Admitting team to be called. Will continue to reassess.

## 2021-02-12 NOTE — SBIRT NOTE ADULT - NSSBIRTUNABLESCROTHER_GEN_A_CORE
Attempted to call pt- did not answer and voicemail is full. Please provide pt with Telephonic SBIRT #[phone].

## 2021-02-12 NOTE — BEHAVIORAL HEALTH ASSESSMENT NOTE - NSBHCONSULTFOLLOWAFTERCARE_PSY_A_CORE FT
per primary team  May f/u as outpatient at [facility]- [phone]  [facility] in [facility] ([phone])   for MMTP referrals

## 2021-02-12 NOTE — ED PROVIDER NOTE - PRINCIPAL DIAGNOSIS
[facility]  Anesthesia Pre-Operative Evaluation         Patient Name: Satinder Schulte  YOB: 1958  MRN: [id number]    SUBJECTIVE:     Pre-operative evaluation for Procedure(s) (LRB):  TRANSPLANT, KIDNEY (N/A)     09/19/2019    Satinder Schulte is a 60 y.o. female w/ a significant PMHx of HTN, HLD, obesity, CVA, and ESRD 2/2 HTN who presents for kidney transplant. Patient currently undergoes HD on Tu/Th/Sat with last dialysis on 09/19/2019.    Patient now presents for the above procedure(s).      LDA: None documented.    Prev airway: None documented.    Drips: None documented.      Patient Active Problem List   Diagnosis    ESRD from HTN, on dialysis since  Feb 2013    Hypertension, renal    Anemia associated with chronic renal failure    Hyperlipidemia    Stroke    Obesity due to excess calories    Awaiting organ transplant status  - 02/18/2013    Blood type A+    Dependence on renal dialysis    Patient on waiting list for kidney transplant    Pancytopenia    Cytopenia    Hematuria, microscopic    History of stroke    Pre-op evaluation       Review of patient's allergies indicates:  No Known Allergies    Current Inpatient Medications:   acetaminophen  650 mg Per NG tube Once    anti-thymo immune glob (THYMOGLOBULIN -rabbit) IVPB  1.5 mg/kg Intravenous Once    diphenhydrAMINE  50 mg Intravenous Once    heparin (porcine)  5,000 Units Subcutaneous Once    hydrALAZINE  25 mg Oral TID    methylPREDNISolone (SOLU-Medrol) IVPB (doses > 250 mg)  500 mg Intravenous Once    [START ON 9/20/2019] NIFEdipine  90 mg Oral Daily       No current facility-administered medications on file prior to encounter.      Current Outpatient Medications on File Prior to Encounter   Medication Sig Dispense Refill    aspirin (ECOTRIN) 81 MG EC tablet Take 81 mg by mouth once daily.      atorvastatin (LIPITOR) 40 MG tablet Take 40 mg by mouth once daily.       B complex-vitamin C-folic acid  (NEPHRO-KATHY) 0.8 mg Tab Take by mouth.      calcitRIOL (ROCALTROL) 0.5 MCG capsule Take 0.5 mcg by mouth once daily.        carvedilol (COREG) 25 MG tablet Take 12.5 mg by mouth 2 (two) times daily with meals.       clonidine (CATAPRES) 0.1 MG tablet Take 0.1 mg by mouth once daily.       ergocalciferol (VITAMIN D2) 50,000 unit capsule Take 50,000 Units by mouth every 30 days.        hydrALAZINE (APRESOLINE) 25 MG tablet Take 100 mg by mouth 3 (three) times daily.       labetalol (NORMODYNE) 300 MG tablet Take 300 mg by mouth 2 (two) times daily.        lactulose (CHRONULAC) 10 gram/15 mL solution Take 10 g by mouth 2 (two) times daily as needed.       loratadine (CLARITIN REDITABS) 10 mg dissolvable tablet Take 10 mg by mouth once daily.      losartan (COZAAR) 100 MG tablet Take 100 mg by mouth once daily.       nifedipine (ADALAT CC) 90 MG TbSR Take 90 mg by mouth 2 (two) times daily.       nystatin (MYCOSTATIN) ointment Apply topically 2 (two) times daily. 15 g 1    pantoprazole (PROTONIX) 40 MG tablet Take 40 mg by mouth once daily.      RENVELA 800 mg Tab 2,400 mg 3 (three) times daily with meals. And 800 mg with snacks      SENSIPAR 60 mg Tab Take 90 mg by mouth once daily.         Past Surgical History:   Procedure Laterality Date    AV FISTULA PLACEMENT  2014    Biopsy-bone marrow Right 8/23/2018    Performed by Anna Lin MD at [facility] OR [address] [place] [address]    CHOLECYSTECTOMY  2008    HYSTERECTOMY  2011    TAHBSO for benign reasons    OOPHORECTOMY         Social History     Socioeconomic History    Marital status: Single     Spouse name: Not on file    Number of children: Not on file    Years of education: Not on file    Highest education level: Not on file   Occupational History    Not on file   Social Needs    Financial resource strain: Not on file    Food insecurity:     Worry: Not on file     Inability: Not on file    Transportation needs:     Medical: Not on file     Non-medical: Not on  file   Tobacco Use    Smoking status: Never Smoker    Smokeless tobacco: Never Used   Substance and Sexual Activity    Alcohol use: No    Drug use: No    Sexual activity: Never     Comment: single, Lives with daughter, on Disability, Lives in [place]   Lifestyle    Physical activity:     Days per week: Not on file     Minutes per session: Not on file    Stress: Not on file   Relationships    Social connections:     Talks on phone: Not on file     Gets together: Not on file     Attends Sabianist service: Not on file     Active member of club or organization: Not on file     Attends meetings of clubs or organizations: Not on file     Relationship status: Not on file   Other Topics Concern    Not on file   Social History Narrative    Disabled    Single    4 children    History of blood transfusions       OBJECTIVE:     Vital Signs Range (Last 24H):  Temp:  [36.7 °C (98 °F)-37.2 °C (98.9 °F)]   Pulse:  [61-76]   Resp:  [16-18]   BP: (144-195)/(80-91)   SpO2:  [97 %-98 %]       Significant Labs:  Lab Results   Component Value Date    WBC 2.39 (L) 09/19/2019    HGB 10.8 (L) 09/19/2019    HCT 34.9 (L) 09/19/2019     (L) 09/19/2019    CHOL 253 (H) 09/19/2019    TRIG 120 09/19/2019    HDL 71 09/19/2019    ALT 7 (L) 09/19/2019    AST 10 09/19/2019     09/19/2019    K 4.9 09/19/2019    CL 99 09/19/2019    CREATININE 8.7 (H) 09/19/2019    BUN 40 (H) 09/19/2019    CO2 25 09/19/2019    INR 1.0 09/19/2019       Diagnostic Studies: No relevant studies.    EKG:   Results for orders placed or performed in visit on 05/02/14   EKG 12-lead    Collection Time: 05/02/14 10:45 AM    Narrative    Vent. Rate : 063 BPM     Atrial Rate : 063 BPM     P-R Int : 140 ms          QRS Dur : 100 ms      QT Int : 452 ms       P-R-T Axes : 049 065 054 degrees     QTc Int : 462 ms    Normal sinus rhythm  Possible Left atrial enlargement  Borderline Abnormal ECG  When compared with ECG of 22-JAN-2013 13:22,  No significant change  was found     Cardiac PET Stress (08/02/2019):    The relative PET images are normal showing no clinically significant regional resting or stress induced perfusion defects.    Whole heart absolute myocardial perfusion (cc/min/g) averaged 1.04  at rest (which is elevated), 1.51 cc/min/g at stress (which is mildly reduced), and 1.47 cc/min/g CFR (which is moderately reduced in part due to elevated resting flow).    Gated perfusion images showed an ejection fraction of 60.0 % at rest and 79 % during stress. Normal is greater than 51%.    Wall motion was normal at rest and during stress.    LV cavity size is normal at rest and stress.    The EKG portion of this study is negative for ischemia.    There were no arrhythmias during stress.    The patient reported no chest pain during the stress test.    There are no prior studies for comparison.    2D ECHO:  TTE (06/15/2018):  CONCLUSIONS     1 - Normal left ventricular systolic function (EF 65-70%).     2 - Indeterminate LV diastolic function.     3 - Normal right ventricular systolic function .     4 - The estimated PA systolic pressure is 21 mmHg.     5 - Trivial mitral regurgitation.     6 - No wall motion abnormalities.     7 - Moderate left atrial enlargement.   LINETTE:  No results found for this or any previous visit.    ASSESSMENT/PLAN:     Anesthesia Evaluation    I have reviewed the Patient Summary Reports.    I have reviewed the Nursing Notes.   I have reviewed the Medications.     Review of Systems  Anesthesia Hx:  No problems with previous Anesthesia  Denies Family Hx of Anesthesia complications.   Denies Personal Hx of Anesthesia complications.   Social:  Non-Smoker, No Alcohol Use    Hematology/Oncology:         -- Anemia: Denies Current/Recent Cancer   EENT/Dental:   denies chronic allergic rhinitis   Cardiovascular:   Hypertension Denies MI.  Denies CAD.    Denies CABG/stent.  Denies Dysrhythmias.          hyperlipidemia ECG has been reviewed.     Pulmonary:   Denies COPD.  Denies Asthma.  Denies Recent URI.  Denies Sleep Apnea.    Renal/:   Chronic Renal Disease, Dialysis, ESRD, renal hypertension    Hepatic/GI:   Denies GERD. Denies Liver Disease.    Neurological:   Denies TIA. CVA Denies Seizures.    Endocrine:   Denies Diabetes.    Psych:   Denies Psychiatric History.          Physical Exam  General:  Well nourished    Airway/Jaw/Neck:  Airway Findings: Mouth Opening: Normal Tongue: Normal  General Airway Assessment: Adult  Mallampati: III  Improves to III with phonation.  TM Distance: Normal, at least 6 cm  Jaw/Neck Findings:  Neck ROM: Normal ROM      Dental:  Dental Findings: Edentulous   Chest/Lungs:  Chest/Lungs Findings: Clear to auscultation, Normal Respiratory Rate     Heart/Vascular:  Heart Findings: Rate: Normal  Rhythm: Regular Rhythm  Sounds: Normal     Abdomen:  Abdomen Findings:  Normal, Soft, Nontender     Musculoskeletal:  Musculoskeletal Findings: Normal   Skin:  Skin Findings: Normal    Mental Status:  Mental Status Findings:  Cooperative, Alert and Oriented         Anesthesia Plan  Type of Anesthesia, risks & benefits discussed:  Anesthesia Type:  general  Patient's Preference:   Intra-op Monitoring Plan: standard ASA monitors, arterial line and central line  Intra-op Monitoring Plan Comments:   Post Op Pain Control Plan: multimodal analgesia, IV/PO Opioids PRN and per primary service following discharge from PACU  Post Op Pain Control Plan Comments:   Induction:   IV  Beta Blocker:  Patient is not currently on a Beta-Blocker (No further documentation required).       Informed Consent: Patient understands risks and agrees with Anesthesia plan.  Questions answered. Anesthesia consent signed with patient.  ASA Score: 3     Day of Surgery Review of History & Physical:    H&P update referred to the surgeon.         Ready For Surgery From Anesthesia Perspective.        Heroin abuse

## 2021-02-12 NOTE — H&P ADULT - NSHPSOCIALHISTORY_GEN_ALL_CORE
Social History:    Marital Status: (  ) , (  ) Single, ( x ) , (  ) , (  )   # of Children: 0  Lives with: ( x ) alone, (  ) children, (  ) spouse, (  ) parents, (  ) siblings, (  ) friends, (  ) other:   Occupation:     Substance Use/Illicit Drugs: (  ) never used vs other: as per HPI  Tobacco Usage: (  ) never smoked, (  ) former smoker, ( x ) current smoker and Total Pack-Years:   Last Alcohol Usage/Frequency/Amount/Withdrawal/Hx of Abuse:  as per HPI  Foreign travel:   Animal exposure:

## 2021-02-12 NOTE — BEHAVIORAL HEALTH ASSESSMENT NOTE - NSBHCONSULTSUBSTANCEALCOHOL_PSY_A_CORE FT
monitor CIWA for alcohol withdrawal, start taper Librium 75mg q4 today (if LFT's are not elevated) with PRN symptom triggered Ativan for breakthrough sx; MVI, folic acid, thiamine supplementation. monitor CIWA for alcohol withdrawal, start taper Librium 50mg to 75mg q4 today (if LFT's are not elevated) with PRN symptom triggered Ativan  2mg q1hr prn for breakthrough sx; MVI, folic acid, thiamine supplementation.

## 2021-02-12 NOTE — ED ADULT NURSE REASSESSMENT NOTE - NS ED NURSE REASSESS COMMENT FT1
Medicine hospitalist, MD Sharma, called at [phone] and made aware of patient's symptoms. Reports he just saw the patient and no further intervention is needed  at this time. Will continue to reassess.

## 2021-02-12 NOTE — BEHAVIORAL HEALTH ASSESSMENT NOTE - SUICIDE PROTECTIVE FACTORS
Identifies reasons for living/Supportive social network of family or friends/Ability to cope with stress

## 2021-02-12 NOTE — ED ADULT NURSE REASSESSMENT NOTE - NS ED NURSE REASSESS COMMENT FT1
PT ripped out PIV and stated that he wanted to leave AMA. Pt talked with MD and now decided to stay. Not necessary for another PIV

## 2021-02-12 NOTE — H&P ADULT - NSHPPHYSICALEXAM_GEN_ALL_CORE
PHYSICAL EXAM:   GENERAL: Alert. Not confused. No acute distress. Not thin. Not cachectic. Not obese.  HEAD:  Atraumatic. Normocephalic.  EYES: EOMI. PERRLA. Normal conjunctiva/sclera.  ENT: Neck supple. No JVD. Moist oral mucosa. Not edentulous. No thrush.  LYMPH: Normal supraclavicular/cervical lymph nodes.   CARDIAC: +tachy, Not luz maria. Regular rhythm. Not irregularly irregular. S1. S2.   LUNG/CHEST: CTAB. BS equal bilaterally. No wheezes. No rales. No rhonchi.  ABDOMEN: Soft. No tenderness. No distension. No fluid wave. Normal bowel sounds.  BACK: No midline/vertebral tenderness. No flank tenderness.  VASCULAR: +2 b/l radial or ulnar pulses. Palpable DP pulses.  EXTREMITIES:  No clubbing. No cyanosis. No edema. Moving all 4.  NEUROLOGY: A&Ox3. Non-focal exam. Cranial nerves intact. Normal speech. Sensation intact.  PSYCH: Normal behavior. Anxious affect.  SKIN: No jaundice. No erythema. No rash/lesion.  Vascular Access:     ICU Vital Signs Last 24 Hrs  T(C): 37.1 (12 Feb 2021 13:25), Max: 37.2 (12 Feb 2021 11:59)  T(F): 98.8 (12 Feb 2021 13:25), Max: 98.9 (12 Feb 2021 11:59)  HR: 100 (12 Feb 2021 13:25) (100 - 129)  BP: 108/91 (12 Feb 2021 13:25) (106/77 - 108/91)  BP(mean): --  ABP: --  ABP(mean): --  RR: 20 (12 Feb 2021 13:25) (20 - 20)  SpO2: 99% (12 Feb 2021 13:25) (96% - 99%)      I&O's Summary

## 2021-02-13 LAB
CULTURE RESULTS: SIGNIFICANT CHANGE UP
SPECIMEN SOURCE: SIGNIFICANT CHANGE UP

## 2021-02-13 PROCEDURE — 99232 SBSQ HOSP IP/OBS MODERATE 35: CPT

## 2021-02-13 RX ORDER — LANOLIN ALCOHOL/MO/W.PET/CERES
5 CREAM (GRAM) TOPICAL AT BEDTIME
Refills: 0 | Status: COMPLETED | OUTPATIENT
Start: 2021-02-13 | End: 2021-02-13

## 2021-02-13 RX ORDER — THIAMINE MONONITRATE (VIT B1) 100 MG
100 TABLET ORAL DAILY
Refills: 0 | Status: DISCONTINUED | OUTPATIENT
Start: 2021-02-13 | End: 2021-02-15

## 2021-02-13 RX ORDER — INFLUENZA VIRUS VACCINE 15; 15; 15; 15 UG/.5ML; UG/.5ML; UG/.5ML; UG/.5ML
0.5 SUSPENSION INTRAMUSCULAR ONCE
Refills: 0 | Status: DISCONTINUED | OUTPATIENT
Start: 2021-02-13 | End: 2021-02-15

## 2021-02-13 RX ORDER — KETOROLAC TROMETHAMINE 30 MG/ML
15 SYRINGE (ML) INJECTION ONCE
Refills: 0 | Status: DISCONTINUED | OUTPATIENT
Start: 2021-02-13 | End: 2021-02-13

## 2021-02-13 RX ORDER — ACETAMINOPHEN 500 MG
650 TABLET ORAL ONCE
Refills: 0 | Status: COMPLETED | OUTPATIENT
Start: 2021-02-13 | End: 2021-02-13

## 2021-02-13 RX ORDER — METHADONE HYDROCHLORIDE 40 MG/1
15 TABLET ORAL EVERY 8 HOURS
Refills: 0 | Status: DISCONTINUED | OUTPATIENT
Start: 2021-02-13 | End: 2021-02-14

## 2021-02-13 RX ADMIN — Medication 50 MILLIGRAM(S): at 17:40

## 2021-02-13 RX ADMIN — Medication 2 MILLIGRAM(S): at 12:59

## 2021-02-13 RX ADMIN — Medication 15 MILLIGRAM(S): at 21:40

## 2021-02-13 RX ADMIN — Medication 50 MILLIGRAM(S): at 05:04

## 2021-02-13 RX ADMIN — METHADONE HYDROCHLORIDE 15 MILLIGRAM(S): 40 TABLET ORAL at 08:56

## 2021-02-13 RX ADMIN — Medication 5 MILLIGRAM(S): at 21:41

## 2021-02-13 RX ADMIN — Medication 1 MILLIGRAM(S): at 08:56

## 2021-02-13 RX ADMIN — Medication 650 MILLIGRAM(S): at 11:07

## 2021-02-13 RX ADMIN — Medication 1 TABLET(S): at 08:56

## 2021-02-13 RX ADMIN — Medication 50 MILLIGRAM(S): at 10:50

## 2021-02-13 RX ADMIN — Medication 100 MILLIGRAM(S): at 05:03

## 2021-02-13 RX ADMIN — SODIUM CHLORIDE 100 MILLILITER(S): 9 INJECTION INTRAMUSCULAR; INTRAVENOUS; SUBCUTANEOUS at 09:01

## 2021-02-13 RX ADMIN — METHADONE HYDROCHLORIDE 15 MILLIGRAM(S): 40 TABLET ORAL at 16:25

## 2021-02-13 RX ADMIN — METHADONE HYDROCHLORIDE 15 MILLIGRAM(S): 40 TABLET ORAL at 00:24

## 2021-02-13 NOTE — PROGRESS NOTE BEHAVIORAL HEALTH - NSBHCONSULTSUBSTANCEOPIATES_PSY_A_CORE FT
Methodone 15mg po q8hrs prn or standing if pt requires three doses daily. monitor CINA/COWS for opiate withdrawal sx- Use methadone 15mg q8 PRN for opiate withdrawal sx (piloerection, rhinorrhea, nasal congestion, myalgias, emesis, diarrhea, abdominal pain)  May taper to 10mg q 8 hrs prn after 24 hours May increase the Methodone 15mg po from q8hrs to q6hrs and gradually taper to 10mgs. monitor CINA/COWS for opiate withdrawal sx- Use methadone 15mg q8 PRN for opiate withdrawal sx (piloerection, rhinorrhea, nasal congestion, myalgias, emesis, diarrhea, abdominal pain)  May taper to 10mg q 8 hrs prn after 24 hours

## 2021-02-14 DIAGNOSIS — M54.5 LOW BACK PAIN: ICD-10-CM

## 2021-02-14 LAB
SARS-COV-2 IGG SERPL QL IA: NEGATIVE — SIGNIFICANT CHANGE UP
SARS-COV-2 IGM SERPL IA-ACNC: 0.06 INDEX — SIGNIFICANT CHANGE UP

## 2021-02-14 PROCEDURE — 99233 SBSQ HOSP IP/OBS HIGH 50: CPT

## 2021-02-14 PROCEDURE — 99232 SBSQ HOSP IP/OBS MODERATE 35: CPT

## 2021-02-14 RX ORDER — LIDOCAINE 4 G/100G
1 CREAM TOPICAL EVERY 24 HOURS
Refills: 0 | Status: DISCONTINUED | OUTPATIENT
Start: 2021-02-14 | End: 2021-02-15

## 2021-02-14 RX ORDER — METHADONE HYDROCHLORIDE 40 MG/1
15 TABLET ORAL EVERY 6 HOURS
Refills: 0 | Status: DISCONTINUED | OUTPATIENT
Start: 2021-02-14 | End: 2021-02-15

## 2021-02-14 RX ORDER — TRAZODONE HCL 50 MG
50 TABLET ORAL AT BEDTIME
Refills: 0 | Status: DISCONTINUED | OUTPATIENT
Start: 2021-02-14 | End: 2021-02-15

## 2021-02-14 RX ORDER — ACETAMINOPHEN 500 MG
1000 TABLET ORAL ONCE
Refills: 0 | Status: COMPLETED | OUTPATIENT
Start: 2021-02-14 | End: 2021-02-14

## 2021-02-14 RX ADMIN — METHADONE HYDROCHLORIDE 15 MILLIGRAM(S): 40 TABLET ORAL at 01:10

## 2021-02-14 RX ADMIN — Medication 1 TABLET(S): at 10:39

## 2021-02-14 RX ADMIN — Medication 50 MILLIGRAM(S): at 08:42

## 2021-02-14 RX ADMIN — Medication 100 MILLIGRAM(S): at 10:39

## 2021-02-14 RX ADMIN — METHADONE HYDROCHLORIDE 15 MILLIGRAM(S): 40 TABLET ORAL at 21:43

## 2021-02-14 RX ADMIN — LIDOCAINE 1 PATCH: 4 CREAM TOPICAL at 10:39

## 2021-02-14 RX ADMIN — METHADONE HYDROCHLORIDE 15 MILLIGRAM(S): 40 TABLET ORAL at 15:22

## 2021-02-14 RX ADMIN — Medication 400 MILLIGRAM(S): at 22:00

## 2021-02-14 RX ADMIN — Medication 50 MILLIGRAM(S): at 21:43

## 2021-02-14 RX ADMIN — METHADONE HYDROCHLORIDE 15 MILLIGRAM(S): 40 TABLET ORAL at 09:25

## 2021-02-14 RX ADMIN — Medication 50 MILLIGRAM(S): at 00:17

## 2021-02-14 RX ADMIN — Medication 50 MILLIGRAM(S): at 14:48

## 2021-02-14 RX ADMIN — Medication 1 MILLIGRAM(S): at 10:39

## 2021-02-14 NOTE — PROGRESS NOTE BEHAVIORAL HEALTH - NSBHCONSULTSUBSTANCEOPIATES_PSY_A_CORE FT
May increase the Methodone 15mg po from q8hrs to q6hrs and gradually taper to 10mgs. monitor CINA/COWS for opiate withdrawal sx- Use methadone 15mg q8 PRN for opiate withdrawal sx (piloerection, rhinorrhea, nasal congestion, myalgias, emesis, diarrhea, abdominal pain)  May taper to 10mg q 8 hrs prn after 24 hours

## 2021-02-14 NOTE — PROGRESS NOTE BEHAVIORAL HEALTH - NSBHCHARTREVIEWINVESTIGATE_PSY_A_CORE FT
< from: 12 Lead ECG (02.12.21 @ 15:38) >  Ventricular Rate 87 BPM  Atrial Rate 87 BPM  P-R Interval 156 ms  QRS Duration 86 ms  Q-T Interval 380 ms    QTC Calculation(Bazett) 457 ms    P Axis 55 degrees    R Axis 17 degrees    T Axis 51 degrees    Diagnosis Line NORMAL SINUS RHYTHM  NORMAL ECG  NO PREVIOUS ECGS AVAILABLE  Confirmed by MD Artem, [facility] ([id number]) on 2/13/2021 10:11:16 AM    < end of copied text >
< from: 12 Lead ECG (02.12.21 @ 15:38) >  Ventricular Rate 87 BPM  Atrial Rate 87 BPM  P-R Interval 156 ms  QRS Duration 86 ms  Q-T Interval 380 ms    QTC Calculation(Bazett) 457 ms    P Axis 55 degrees    R Axis 17 degrees    T Axis 51 degrees    Diagnosis Line NORMAL SINUS RHYTHM  NORMAL ECG  NO PREVIOUS ECGS AVAILABLE  Confirmed by MD Artem, [facility] [id number]) on 2/13/2021 10:11:16 AM    < end of copied text >

## 2021-02-14 NOTE — PROGRESS NOTE BEHAVIORAL HEALTH - RISK ASSESSMENT
Pt is a chronic risk for accidental overdose because of his heroin use.  he was counseled to have Narcan at home and inform his family that he may be at risk for accidental overdose.
Pt is a chronic risk for accidental overdose because of his heroin use.  he was counseled to have Narcan at home and inform his family that he may be at risk for accidental overdose.

## 2021-02-15 VITALS
HEART RATE: 82 BPM | DIASTOLIC BLOOD PRESSURE: 74 MMHG | RESPIRATION RATE: 18 BRPM | TEMPERATURE: 98 F | SYSTOLIC BLOOD PRESSURE: 124 MMHG | OXYGEN SATURATION: 98 %

## 2021-02-15 LAB
ALBUMIN SERPL ELPH-MCNC: 3.7 G/DL — SIGNIFICANT CHANGE UP (ref 3.3–5)
ALP SERPL-CCNC: 99 U/L — SIGNIFICANT CHANGE UP (ref 40–120)
ALT FLD-CCNC: 25 U/L — SIGNIFICANT CHANGE UP (ref 10–45)
ANION GAP SERPL CALC-SCNC: 12 MMOL/L — SIGNIFICANT CHANGE UP (ref 5–17)
AST SERPL-CCNC: 21 U/L — SIGNIFICANT CHANGE UP (ref 10–40)
BILIRUB SERPL-MCNC: 0.2 MG/DL — SIGNIFICANT CHANGE UP (ref 0.2–1.2)
BUN SERPL-MCNC: 16 MG/DL — SIGNIFICANT CHANGE UP (ref 7–23)
CALCIUM SERPL-MCNC: 9 MG/DL — SIGNIFICANT CHANGE UP (ref 8.4–10.5)
CHLORIDE SERPL-SCNC: 104 MMOL/L — SIGNIFICANT CHANGE UP (ref 96–108)
CO2 SERPL-SCNC: 24 MMOL/L — SIGNIFICANT CHANGE UP (ref 22–31)
CREAT SERPL-MCNC: 1.11 MG/DL — SIGNIFICANT CHANGE UP (ref 0.5–1.3)
GLUCOSE SERPL-MCNC: 86 MG/DL — SIGNIFICANT CHANGE UP (ref 70–99)
HCT VFR BLD CALC: 40.5 % — SIGNIFICANT CHANGE UP (ref 39–50)
HGB BLD-MCNC: 13.8 G/DL — SIGNIFICANT CHANGE UP (ref 13–17)
MAGNESIUM SERPL-MCNC: 2.1 MG/DL — SIGNIFICANT CHANGE UP (ref 1.6–2.6)
MCHC RBC-ENTMCNC: 28.2 PG — SIGNIFICANT CHANGE UP (ref 27–34)
MCHC RBC-ENTMCNC: 34.1 GM/DL — SIGNIFICANT CHANGE UP (ref 32–36)
MCV RBC AUTO: 82.7 FL — SIGNIFICANT CHANGE UP (ref 80–100)
NRBC # BLD: 0 /100 WBCS — SIGNIFICANT CHANGE UP (ref 0–0)
PHOSPHATE SERPL-MCNC: 4.6 MG/DL — HIGH (ref 2.5–4.5)
PLATELET # BLD AUTO: 348 K/UL — SIGNIFICANT CHANGE UP (ref 150–400)
POTASSIUM SERPL-MCNC: 4.1 MMOL/L — SIGNIFICANT CHANGE UP (ref 3.5–5.3)
POTASSIUM SERPL-SCNC: 4.1 MMOL/L — SIGNIFICANT CHANGE UP (ref 3.5–5.3)
PROT SERPL-MCNC: 6.5 G/DL — SIGNIFICANT CHANGE UP (ref 6–8.3)
RBC # BLD: 4.9 M/UL — SIGNIFICANT CHANGE UP (ref 4.2–5.8)
RBC # FLD: 14.6 % — HIGH (ref 10.3–14.5)
SODIUM SERPL-SCNC: 140 MMOL/L — SIGNIFICANT CHANGE UP (ref 135–145)
WBC # BLD: 10.02 K/UL — SIGNIFICANT CHANGE UP (ref 3.8–10.5)
WBC # FLD AUTO: 10.02 K/UL — SIGNIFICANT CHANGE UP (ref 3.8–10.5)

## 2021-02-15 PROCEDURE — 99285 EMERGENCY DEPT VISIT HI MDM: CPT | Mod: 25

## 2021-02-15 PROCEDURE — 87086 URINE CULTURE/COLONY COUNT: CPT

## 2021-02-15 PROCEDURE — U0003: CPT

## 2021-02-15 PROCEDURE — 99233 SBSQ HOSP IP/OBS HIGH 50: CPT

## 2021-02-15 PROCEDURE — 93005 ELECTROCARDIOGRAM TRACING: CPT

## 2021-02-15 PROCEDURE — 80307 DRUG TEST PRSMV CHEM ANLYZR: CPT

## 2021-02-15 PROCEDURE — 83735 ASSAY OF MAGNESIUM: CPT

## 2021-02-15 PROCEDURE — 99231 SBSQ HOSP IP/OBS SF/LOW 25: CPT

## 2021-02-15 PROCEDURE — 86769 SARS-COV-2 COVID-19 ANTIBODY: CPT

## 2021-02-15 PROCEDURE — 85027 COMPLETE CBC AUTOMATED: CPT

## 2021-02-15 PROCEDURE — 85025 COMPLETE CBC W/AUTO DIFF WBC: CPT

## 2021-02-15 PROCEDURE — 80053 COMPREHEN METABOLIC PANEL: CPT

## 2021-02-15 PROCEDURE — U0005: CPT

## 2021-02-15 PROCEDURE — 84100 ASSAY OF PHOSPHORUS: CPT

## 2021-02-15 RX ORDER — METHADONE HYDROCHLORIDE 40 MG/1
10 TABLET ORAL EVERY 8 HOURS
Refills: 0 | Status: DISCONTINUED | OUTPATIENT
Start: 2021-02-15 | End: 2021-02-15

## 2021-02-15 RX ORDER — BACLOFEN 100 %
5 POWDER (GRAM) MISCELLANEOUS EVERY 12 HOURS
Refills: 0 | Status: DISCONTINUED | OUTPATIENT
Start: 2021-02-15 | End: 2021-02-15

## 2021-02-15 RX ADMIN — METHADONE HYDROCHLORIDE 10 MILLIGRAM(S): 40 TABLET ORAL at 21:15

## 2021-02-15 RX ADMIN — METHADONE HYDROCHLORIDE 15 MILLIGRAM(S): 40 TABLET ORAL at 04:35

## 2021-02-15 RX ADMIN — Medication 50 MILLIGRAM(S): at 21:15

## 2021-02-15 RX ADMIN — Medication 1 MILLIGRAM(S): at 10:31

## 2021-02-15 RX ADMIN — Medication 5 MILLIGRAM(S): at 15:33

## 2021-02-15 RX ADMIN — Medication 50 MILLIGRAM(S): at 05:54

## 2021-02-15 RX ADMIN — Medication 100 MILLIGRAM(S): at 10:31

## 2021-02-15 RX ADMIN — METHADONE HYDROCHLORIDE 10 MILLIGRAM(S): 40 TABLET ORAL at 14:39

## 2021-02-15 RX ADMIN — Medication 50 MILLIGRAM(S): at 17:43

## 2021-02-15 RX ADMIN — Medication 1 TABLET(S): at 10:31

## 2021-02-15 RX ADMIN — METHADONE HYDROCHLORIDE 15 MILLIGRAM(S): 40 TABLET ORAL at 10:31

## 2021-02-15 NOTE — PROGRESS NOTE ADULT - PROBLEM SELECTOR PLAN 2
- suspect binge type etoh use to self-medicate  - c/w CIWA.  - Librium taper.   - thiamine, b12, mvi
- suspect binge type etoh use to self-medicate  - C/w CIWA.  - Librium taper.   - thiamine, b12, mvt
- suspect binge type etoh use to self-medicate  - c/w CIWA.  - Librium taper  - thiamine, b12, mvi

## 2021-02-15 NOTE — PROGRESS NOTE BEHAVIORAL HEALTH - NSBHCONSULTSUBSTANCEALCOHOL_PSY_A_CORE FT
monitor CIWA for alcohol withdrawal, continue Librium 50mg q6hrs today and may taper to q8hrs after 24 hours  (if LFT's are not elevated) with PRN symptom triggered Ativan  2mg q1hr prn for breakthrough sx; MVI, folic acid, thiamine supplementation.
monitor CIWA for alcohol withdrawal, continue Librium 50mg q12hrs today then 1 dose
monitor CIWA for alcohol withdrawal, continue Librium 50mg q6hrs today and may taper to q8hrs after 24 hours  (if LFT's are not elevated) with PRN symptom triggered Ativan  2mg q1hr prn for breakthrough sx; MVI, folic acid, thiamine supplementation.

## 2021-02-15 NOTE — PROGRESS NOTE ADULT - PROBLEM SELECTOR PLAN 3
chronic low back pain.  - we discussed as he is on methadone, he will not be getting oxycodone, morphine, nor dilaudid  - lidocaine patch, tylenol  - will add baclofen PRN today for muscle spasm
chronic low back pain.  - we discussed as he is on methadone, he will not be getting oxycodone, morphine, nor dilaudid  - lidocaine patch, tylenol
- suspect from etoh and opiate withdrawal  - monitor off antibiotics  - cont IVF

## 2021-02-15 NOTE — PROGRESS NOTE BEHAVIORAL HEALTH - NSBHCONSULTFOLLOWAFTERCARE_PSY_A_CORE FT
per primary team  May f/u as outpatient at [facility] outpt [facility]- [phone]  [facility] in [facility] ([phone])   for MMTP referrals
Pt says that he prefers to stay on Methadone and would like to attend the:  [facility]  [phone]  [address] [place] [address]  May f/u as outpatient at [facility] outpt [facility]- [phone]  [facility] in [facility] ([phone])   for MMTP referrals
Pt says that he prefers to stay on Methadone and would like to attend the:  [facility]  [phone]  [address] [place] [address]  May f/u as outpatient at [facility] outpt [facility]- [phone]  [facility] in [facility] ([phone])   for MMTP referrals

## 2021-02-15 NOTE — PROGRESS NOTE ADULT - PROBLEM SELECTOR PLAN 4
resolved.  - suspect from etoh and opiate withdrawal  - monitor off antibiotics  - s/p IVF
- suspect from etoh and opiate withdrawal  - monitor off antibiotics  - s/p IVF
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
## 2021-02-15 NOTE — PROGRESS NOTE ADULT - SUBJECTIVE AND OBJECTIVE BOX
Patient is a 31y old  Male who presents with a chief complaint of heroin withdrawal (12 Feb 2021 21:21)      SUBJECTIVE / OVERNIGHT EVENTS:  Reports back pain.   No evidence of withdrawal.     MEDICATIONS  (STANDING):  chlordiazePOXIDE   Oral   chlordiazePOXIDE 50 milliGRAM(s) Oral every 6 hours  chlordiazePOXIDE 50 milliGRAM(s) Oral every 8 hours  folic acid 1 milliGRAM(s) Oral daily  influenza   Vaccine 0.5 milliLiter(s) IntraMuscular once  multivitamin 1 Tablet(s) Oral daily  thiamine 100 milliGRAM(s) Oral daily    MEDICATIONS  (PRN):  LORazepam     Tablet 2 milliGRAM(s) Oral every 2 hours PRN Symptom-triggered 2 point increase in CIWA-Ar  LORazepam   Injectable 2 milliGRAM(s) IV Push every 1 hour PRN Symptom-triggered: each CIWA -Ar score 8 or GREATER  methadone    Tablet 15 milliGRAM(s) Oral every 8 hours PRN Moderate Pain (4 - 6)  ondansetron Injectable 4 milliGRAM(s) IV Push every 6 hours PRN Nausea and/or Vomiting      CAPILLARY BLOOD GLUCOSE        I&O's Summary    12 Feb 2021 07:01  -  13 Feb 2021 07:00  --------------------------------------------------------  IN: 0 mL / OUT: 100 mL / NET: -100 mL    13 Feb 2021 07:01  -  13 Feb 2021 12:39  --------------------------------------------------------  IN: 240 mL / OUT: 0 mL / NET: 240 mL        PHYSICAL EXAM:  Vital Signs Last 24 Hrs  T(C): 36.6 (13 Feb 2021 09:02), Max: 37.1 (12 Feb 2021 13:25)  T(F): 97.9 (13 Feb 2021 09:02), Max: 98.8 (12 Feb 2021 13:25)  HR: 84 (13 Feb 2021 09:02) (84 - 100)  BP: 125/86 (13 Feb 2021 09:02) (108/91 - 144/93)  BP(mean): --  RR: 18 (13 Feb 2021 09:02) (18 - 20)  SpO2: 99% (13 Feb 2021 09:02) (98% - 99%)  GENERAL: NAD, well-developed  HEAD:  Atraumatic, Normocephalic  EYES: EOMI, PERRLA, conjunctiva and sclera clear  NECK: Supple, No JVD  CHEST/LUNG: Clear to auscultation bilaterally; No wheeze  HEART: Regular rate and rhythm; No murmurs, rubs, or gallops  ABDOMEN: Soft, Nontender, Nondistended; Bowel sounds present  EXTREMITIES:  2+ Peripheral Pulses, No clubbing, cyanosis, or edema  PSYCH: AAOx3  NEUROLOGY: non-focal  SKIN: No rashes or lesions    LABS:                        15.4   7.12  )-----------( 365      ( 12 Feb 2021 14:37 )             44.6     02-12    140  |  106  |  12  ----------------------------<  79  4.6   |  19<L>  |  0.82    Ca    9.5      12 Feb 2021 14:37    TPro  7.1  /  Alb  4.3  /  TBili  0.4  /  DBili  x   /  AST  24  /  ALT  26  /  AlkPhos  107  02-12              RADIOLOGY & ADDITIONAL TESTS:    Imaging Personally Reviewed:    Consultant(s) Notes Reviewed:      Care Discussed with Consultants/Other Providers:  
[facility] Division of Hospital Medicine  Maria Luisa Calvillo MD  Pager (ZEENAT-F, 8A-5P): [phone]  Other Times:  [phone]      Patient is a 31y old  Male who presents with a chief complaint of heroin withdrawal (14 Feb 2021 17:29)      SUBJECTIVE / OVERNIGHT EVENTS: no acute events overnight. diarrhea improving. only 1 episode overnight of "soft stool". his low back pain improved today with addition of lidocaine patch. slept better to with trazodone.   ADDITIONAL REVIEW OF SYSTEMS:    MEDICATIONS  (STANDING):  chlordiazePOXIDE   Oral   chlordiazePOXIDE 50 milliGRAM(s) Oral every 12 hours  chlordiazePOXIDE 50 milliGRAM(s) Oral once  folic acid 1 milliGRAM(s) Oral daily  influenza   Vaccine 0.5 milliLiter(s) IntraMuscular once  lidocaine   Patch 1 Patch Transdermal every 24 hours  methadone    Tablet 10 milliGRAM(s) Oral every 8 hours  multivitamin 1 Tablet(s) Oral daily  thiamine 100 milliGRAM(s) Oral daily    MEDICATIONS  (PRN):  baclofen 5 milliGRAM(s) Oral every 12 hours PRN spasm  LORazepam     Tablet 2 milliGRAM(s) Oral every 2 hours PRN Symptom-triggered 2 point increase in CIWA-Ar  LORazepam   Injectable 2 milliGRAM(s) IV Push every 1 hour PRN Symptom-triggered: each CIWA -Ar score 8 or GREATER  ondansetron Injectable 4 milliGRAM(s) IV Push every 6 hours PRN Nausea and/or Vomiting  traZODone 50 milliGRAM(s) Oral at bedtime PRN insomnia      CAPILLARY BLOOD GLUCOSE        I&O's Summary    14 Feb 2021 07:01  -  15 Feb 2021 07:00  --------------------------------------------------------  IN: 960 mL / OUT: 0 mL / NET: 960 mL    15 Feb 2021 07:01  -  15 Feb 2021 16:53  --------------------------------------------------------  IN: 480 mL / OUT: 0 mL / NET: 480 mL        PHYSICAL EXAM:  Vital Signs Last 24 Hrs  T(C): 36.6 (15 Feb 2021 12:13), Max: 37 (14 Feb 2021 20:30)  T(F): 97.8 (15 Feb 2021 12:13), Max: 98.6 (14 Feb 2021 20:30)  HR: 75 (15 Feb 2021 12:13) (62 - 80)  BP: 109/73 (15 Feb 2021 12:13) (100/67 - 115/73)  BP(mean): --  RR: 17 (15 Feb 2021 12:13) (17 - 18)  SpO2: 98% (15 Feb 2021 12:13) (98% - 99%)    CONSTITUTIONAL: NAD, well-developed, well-groomed  EYES: PERRLA; conjunctiva and sclera clear  ENMT: Moist oral mucosa, no pharyngeal injection or exudates; normal dentition  NECK: Supple, no palpable masses; no thyromegaly  RESPIRATORY: Normal respiratory effort; lungs are clear to auscultation bilaterally  CARDIOVASCULAR: Regular rate and rhythm, normal S1 and S2, no murmur/rub/gallop; No lower extremity edema; Peripheral pulses are 2+ bilaterally  ABDOMEN: Soft, Nondistended,  Nontender to palpation, normoactive bowel sounds  MUSCULOSKELETAL:  No clubbing or cyanosis of digits; no joint swelling or tenderness to palpation, +no longer tender to palpation in L low back though still with mild spasm  PSYCH: A+O to person, place, and time; affect appropriate  NEUROLOGY: CN 2-12 are intact and symmetric; no gross sensory deficits   SKIN: No rashes; no palpable lesions    LABS:                        13.8   10.02 )-----------( 348      ( 15 Feb 2021 07:06 )             40.5     02-15    140  |  104  |  16  ----------------------------<  86  4.1   |  24  |  1.11    Ca    9.0      15 Feb 2021 07:02  Phos  4.6     02-15  Mg     2.1     02-15    TPro  6.5  /  Alb  3.7  /  TBili  0.2  /  DBili  x   /  AST  21  /  ALT  25  /  AlkPhos  99  02-15        Culture - Urine (collected 12 Feb 2021 19:11)  Source: .Urine Clean Catch (Midstream)  Final Report (13 Feb 2021 15:32):    <10,000 CFU/mL Normal Urogenital Tootie        RADIOLOGY & ADDITIONAL TESTS:  Results Reviewed: H/H stable, electrolytes at goal, LFTs stable, mild Cr elevation   Imaging Personally Reviewed:  Electrocardiogram Personally Reviewed:    COORDINATION OF CARE:  Care Discussed with Consultants/Other Providers [Y]: medicine ANIBAL Torres  Prior or Outpatient Records Reviewed [Y]: Behavioral Health Progress note  
[facility] Division of Hospital Medicine  Maria Luisa Calvillo MD  Pager (M-F, 8A-5P): [phone]  Other Times:  [phone]      Patient is a 31y old  Male who presents with a chief complaint of heroin withdrawal (13 Feb 2021 12:38)      SUBJECTIVE / OVERNIGHT EVENTS: no acute events overnight. complaining of chronic low back pain flaring up but otherwise feels better on methadone and librium taper.   ADDITIONAL REVIEW OF SYSTEMS:    MEDICATIONS  (STANDING):  chlordiazePOXIDE   Oral   folic acid 1 milliGRAM(s) Oral daily  influenza   Vaccine 0.5 milliLiter(s) IntraMuscular once  lidocaine   Patch 1 Patch Transdermal every 24 hours  multivitamin 1 Tablet(s) Oral daily  thiamine 100 milliGRAM(s) Oral daily    MEDICATIONS  (PRN):  LORazepam     Tablet 2 milliGRAM(s) Oral every 2 hours PRN Symptom-triggered 2 point increase in CIWA-Ar  LORazepam   Injectable 2 milliGRAM(s) IV Push every 1 hour PRN Symptom-triggered: each CIWA -Ar score 8 or GREATER  methadone    Tablet 15 milliGRAM(s) Oral every 6 hours PRN Moderate Pain (4 - 6)  ondansetron Injectable 4 milliGRAM(s) IV Push every 6 hours PRN Nausea and/or Vomiting  traZODone 50 milliGRAM(s) Oral at bedtime PRN insomnia      CAPILLARY BLOOD GLUCOSE        I&O's Summary    13 Feb 2021 07:01  -  14 Feb 2021 07:00  --------------------------------------------------------  IN: 1320 mL / OUT: 0 mL / NET: 1320 mL        PHYSICAL EXAM:  Vital Signs Last 24 Hrs  T(C): 36.9 (14 Feb 2021 12:43), Max: 36.9 (13 Feb 2021 21:06)  T(F): 98.4 (14 Feb 2021 12:43), Max: 98.4 (13 Feb 2021 21:06)  HR: 78 (14 Feb 2021 12:43) (67 - 81)  BP: 110/74 (14 Feb 2021 12:43) (110/74 - 119/74)  BP(mean): --  RR: 18 (14 Feb 2021 12:43) (18 - 18)  SpO2: 99% (14 Feb 2021 12:43) (99% - 99%)    CONSTITUTIONAL: NAD, well-developed, well-groomed  EYES: PERRLA; conjunctiva and sclera clear  ENMT: Moist oral mucosa, no pharyngeal injection or exudates; normal dentition  NECK: Supple, no palpable masses; no thyromegaly  RESPIRATORY: Normal respiratory effort; lungs are clear to auscultation bilaterally  CARDIOVASCULAR: Regular rate and rhythm, normal S1 and S2, no murmur/rub/gallop; No lower extremity edema; Peripheral pulses are 2+ bilaterally  ABDOMEN: Soft, Nondistended,  Nontender to palpation, normoactive bowel sounds  MUSCULOSKELETAL:  No clubbing or cyanosis of digits; no joint swelling or tenderness to palpation, +tender to palpation in L low back  PSYCH: A+O to person, place, and time; affect appropriate  NEUROLOGY: CN 2-12 are intact and symmetric; no gross sensory deficits   SKIN: No rashes; no palpable lesions    LABS:            Culture - Urine (collected 12 Feb 2021 19:11)  Source: .Urine Clean Catch (Midstream)  Final Report (13 Feb 2021 15:32):    <10,000 CFU/mL Normal Urogenital Tootie    COVID Ab neg    RADIOLOGY & ADDITIONAL TESTS:  Results Reviewed: urine culture negative, negative COVID ab, utox +methadone  Imaging Personally Reviewed:  Electrocardiogram Personally Reviewed:    COORDINATION OF CARE:  Care Discussed with Consultants/Other Providers [Y]: medicine ANIBAL Palm  Prior or Outpatient Records Reviewed [Y]: Behavioral Health progress note

## 2021-02-15 NOTE — PROGRESS NOTE BEHAVIORAL HEALTH - PRIMARY DX
Alcohol withdrawal syndrome with complication

## 2021-02-15 NOTE — PROGRESS NOTE ADULT - ATTENDING COMMENTS
Maria Luisa Calvillo MD  Division of Hospital Medicine  [facility]   Pager: [phone]
Maria Luisa Calvillo MD  Division of Hospital Medicine  [facility]   Pager: [phone]

## 2021-02-15 NOTE — PROGRESS NOTE BEHAVIORAL HEALTH - NSBHCONSULTMEDSLEEP_PSY_A_CORE FT
Trazodone 50mg po prn qHS for insomnia

## 2021-02-15 NOTE — PROGRESS NOTE ADULT - ASSESSMENT
31M c hx polysubstance abuse, current smoker, pw etoh and heroin withdrawal on methadone and librium taper.
31M c hx polysubstance abuse, current smoker, pw etoh and heroin withdrawal on methadone and librium taper.
31M c hx polysubstance abuse, current smoker, pw etoh and heroin withdrawal

## 2021-02-15 NOTE — PROGRESS NOTE BEHAVIORAL HEALTH - NSBHCHARTREVIEWLAB_PSY_A_CORE FT
02-15    140  |  104  |  16  ----------------------------<  86  4.1   |  24  |  1.11    Ca    9.0      15 Feb 2021 07:02  Phos  4.6     02-15  Mg     2.1     02-15    TPro  6.5  /  Alb  3.7  /  TBili  0.2  /  DBili  x   /  AST  21  /  ALT  25  /  AlkPhos  99  02-15
15.4   7.12  )-----------( 365      ( 12 Feb 2021 14:37 )             44.6   02-12    140  |  106  |  12  ----------------------------<  79  4.6   |  19<L>  |  0.82    Ca    9.5      12 Feb 2021 14:37    TPro  7.1  /  Alb  4.3  /  TBili  0.4  /  DBili  x   /  AST  24  /  ALT  26  /  AlkPhos  107  02-12
15.4   7.12  )-----------( 365      ( 12 Feb 2021 14:37 )             44.6   02-12    140  |  106  |  12  ----------------------------<  79  4.6   |  19<L>  |  0.82    Ca    9.5      12 Feb 2021 14:37    TPro  7.1  /  Alb  4.3  /  TBili  0.4  /  DBili  x   /  AST  24  /  ALT  26  /  AlkPhos  107  02-12

## 2021-02-15 NOTE — PROGRESS NOTE ADULT - PROBLEM SELECTOR PLAN 1
- Psych recs appreciated.   - SW to see in AM to provide detox resources  - cont methadone 15q8h as per psych recs  - otherwise symptomatic relief
- Psych recs appreciated.  - SW to see patient to provide detox resources  - increased methadone to 15mg q6h today as per psych recs  - will taper to 10mg q8h PRN after 24 hours  - otherwise symptomatic relief
- Psych recs appreciated.  - SW to see patient to provide detox resources  - tapered methadone to 10 mg q8h as per yesterday's psychiatry attending recs  - will taper to 10mg q12h tomorrow, 2/16  - otherwise symptomatic relief

## 2021-02-15 NOTE — PROGRESS NOTE BEHAVIORAL HEALTH - NSBHCONSULTSUBSTANCEOPIATES_PSY_A_CORE FT
Methodone 15mg po from q6hrs PRN - change frequency to q8hrs today; reduce to 10mg PO BID tomorrow and so on.

## 2021-02-15 NOTE — CHART NOTE - NSCHARTNOTEFT_GEN_A_CORE
[id number]  AUSTYN MCCOY    Notified by RN/RN manager that patient noted to be missing from room. Patient's phone was called and he offered multiple different stories (stating he went to [facility] where his mother is being admitted, he is with his brother, he is at home). Patient left with IVL and was instructed to return to the hospital for removal of IV however he is refusing.       Ronnie Loera PA-C  Dept of Medicine [phone]  AUSTYN MCCOY    Notified by RN and RN manager that patient noted to be missing from room this evening. Briefly, this is a 31yoM with PMHx polysubstance abuse, current smoker who p/w EtOH and heroin withdrawal on methadone and librium taper.   Patient's cellphone was called and he offered multiple different stories (stating he went to [facility] where his mother is being admitted, he is with his brother, he is at home). Patient left with IV and was instructed to return to the hospital for removal of IV however he is refusing.   [facility], [address] was called and will perform a wellness check at address on patient's file.   [facility] Dr. Langford was updated on the above.       Ronnie Loera PA-C  Dept of Medicine  [id number] [phone]  AUSTYN MCCOY    Notified by RN and RN manager that patient noted to be missing from room this evening. Patient last seen in room 452D at approximately 22:00 when he was given evening medications. Briefly, this is a 31yoM with PMHx polysubstance abuse, current smoker who p/w EtOH and heroin withdrawal on methadone and librium taper.   Patient's cellphone was called and he offered multiple different stories (stating he went to [facility] where his mother is being admitted, he is with his brother, he is at home). Patient left with IV and was instructed to return to the hospital for removal of IV however he is refusing.   [facility] PD, [address] was called and will perform a wellness check at address on patient's file.   [facility] Dr. Langford was updated on the above.       Ronnie Loera PA-C  Dept of Medicine  [phone]  AUSTYN MCCOY    Notified by RN and RN manager that patient noted to be missing from room this evening. Patient last seen in room 452D at approximately 22:00 when he was given evening medications. Briefly, this is a 31yoM with PMHx polysubstance abuse, current smoker who p/w EtOH and heroin withdrawal on methadone and librium taper.   Patient's cellphone was called and he offered multiple different stories (stating he went to [facility] where his mother is being admitted, he is with his brother, he is at home). Patient left with IV (in LUE) and was instructed to return to the hospital for removal of IV however he is refusing.   [facility] PD, [address] was called and will perform a wellness check at address on patient's file.   [facility] Dr. Langford was updated on the above.       Ronnie Loera PA-C  Dept of Medicine  [phone]  AUSTYN MCCOY    Notified by RN and RN manager that patient noted to be missing from room this evening. Patient last seen in room 452D at approximately 22:00 when he was given evening medications. Administration aware and involved. Briefly, this is a 31yoM with PMHx polysubstance abuse, current smoker who p/w EtOH and heroin withdrawal on methadone and librium taper.   Patient's cellphone was called and he offered multiple different stories (stating he went to [facility] where his mother is being admitted, he is with his brother, he is at home). Patient left with IV (in LUE) and was instructed to return to the hospital for removal of IV however he is refusing.   [facility] PD, [address] was called and will perform a wellness check at address on patient's file.   Hospitalist in Charge (HIC) Dr. Langford was made aware of the above.       Ronnie Loera PA-C  Dept of Medicine  [phone]  AUSTYN MCCOY    Notified by RN and RN manager that patient noted to be missing from room this evening. Patient last seen in room 452D on 4[facility] at approximately 22:00 when he was given evening medications. Administration aware and involved. Briefly, this is a 31yoM with PMHx polysubstance abuse, current smoker who p/w EtOH and heroin withdrawal on methadone and librium taper.   Patient's cellphone was called and he offered multiple different stories (stating he went to [facility] where his mother is being admitted, he is with his brother, he is at home). Patient left with IV (in LUE) and was instructed to return to the hospital for removal of IV however he is refusing.   [facility] PD, [address] was called and will perform a wellness check at address on patient's file.   Hospitalist in Charge ([facility]) Dr. Langford was made aware of the above.       Ronnie Loera PA-C  Dept of Medicine  [phone]  AUSTYN MCCOY    Notified by RN and RN manager that patient noted to be missing from room this evening. Patient last seen in room 452D on [facility] at approximately 22:00 when he was given evening medications. Administration aware and involved. Briefly, this is a 31yoM with PMHx polysubstance abuse, current smoker who p/w EtOH and heroin withdrawal on methadone and librium taper.   Patient's cellphone was called and he offered multiple different stories (stating he went to [facility] where his mother is being admitted, he is with his brother, he is at home). Patient left with IV (in LUE) and was advised to return to the hospital for removal of IV however he is refusing.   [facility] PD, [address] was called and will perform a wellness check at address on patient's file.   Hospitalist in Charge ([facility]) Dr. Langford was made aware of the above.       Ronnie Loera PA-C  Dept of Medicine  [phone]  AUSTYN MCCOY    Notified by RN and RN manager that patient noted to be missing from room this evening. Patient last seen in room 452D on 4[facility] at approximately 21:15 when he was given evening medications. Administration aware and involved. Briefly, this is a 31yoM with PMHx polysubstance abuse, current smoker who p/w EtOH and heroin withdrawal on methadone and librium taper. Patient noted to be missing from room at approximately 21:45.  Patient's cellphone was called and he offered multiple different stories (stating he went to [facility] where his mother is being admitted, he is with his brother, he is at home). Patient left with IV (in LUE) and was advised to return to the hospital for removal of IV however he is refusing.   [facility] PD, [address] was called and will perform a wellness check at address on patient's file.   Hospitalist in Charge ([facility]) Dr. Langford was made aware of the above.       Ronnie Loera PA-C  Dept of Medicine  [phone]  AUSTYN MCCOY    Notified by RN and RN manager that patient noted to be missing from room this evening. Patient last seen in room 452D on 4[facility] at approximately 21:15 when he was given evening medications. Administration aware and involved. Briefly, this is a 31yoM with PMHx polysubstance abuse, current smoker who p/w EtOH and heroin withdrawal on methadone and librium taper; he is A&O x3. Patient noted to be missing from room at approximately 21:45.  Patient's cellphone was called and he offered multiple different stories (stating he went to [facility] where his mother is being admitted, he is with his brother, he is at home). Patient left with IV (in LUE) and was advised to return to the hospital for removal of IV however he is refusing.   [facility] PD, [address] was called and will perform a wellness check at address on patient's file.   Hospitalist in Charge (HIC) Dr. Langford was made aware of the above.       Ronnie Loera PA-C  Dept of Medicine  [phone]

## 2021-02-15 NOTE — PROGRESS NOTE BEHAVIORAL HEALTH - SUMMARY
Pt is a 32 y/o Irish  man, no kids, lives with parents in [place], unemployed- previously worked in construction, with no PMHx, with PPHx of daily heroin use (smokes 15 bags daily for the past 3 months), drinking whiskey (5drinks) per day for the past few days to help with the opiate withdrawal.  On evaluation patient noted to be excessively yawning, restless, feels "I'm jumping out of my skin." Patient started on standing Librium taper for alcohol withdrawal and methadone PRN for opiate withdrawal sx.    - Patient has capacity to make his medical decisions
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 Psychiatry consulted to assess for heroin withdrawal, detox, alcohol withdrawal.  Patient seen and evaluated, reports using heroin 15 bags daily, for the past 3 months, also states has been drinking whiskey (5drinks) per day for the past few days to help with the opiate withdrawal.  On evaluation patient noted to be excessively yawning, restless, feels "I'm jumping out of my skin."  Patient noted to be tachycardic up to 130's, denies that he has any h/o withdrawal seizures from alcohol.  Patient reports motivation to start MMTP, was counseled on Suboxone vs methadone, given information on clinic in [place] and in [place].  Also counseled on consequences of continued use of substances such as accidental overdose, importance of ensuring patient with access to Narcan, importance of patient informing family members of symptoms concerning for overdose.  Patient agrees to be admitted to the hospital alcohol detox and management of opiate withdrawal sx.    Pt did begin a standing Librium taper for alcohol withdrawal and reports feeling much better.   He is also on methadone PRN for opiate withdrawal sx.  See below for recommendations.
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 Psychiatry consulted to assess for heroin withdrawal, detox, alcohol withdrawal.  Patient seen and evaluated, reports using heroin 15 bags daily, for the past 3 months, also states has been drinking whiskey (5drinks) per day for the past few days to help with the opiate withdrawal.  On evaluation patient noted to be excessively yawning, restless, feels "I'm jumping out of my skin."  Patient noted to be tachycardic up to 130's, denies that he has any h/o withdrawal seizures from alcohol.  Patient reports motivation to start MMTP, was counseled on Suboxone vs methadone, given information on clinic in [place] and in [place].  Also counseled on consequences of continued use of substances such as accidental overdose, importance of ensuring patient with access to Narcan, importance of patient informing family members of symptoms concerning for overdose.  Patient agrees to be admitted to the hospital alcohol detox and management of opiate withdrawal sx.    Pt did begin a standing Librium taper for alcohol withdrawal and reports feeling much better.   He is also on methadone PRN for opiate withdrawal sx.  See below for recommendations.

## 2021-02-16 NOTE — PROVIDER CONTACT NOTE (OTHER) - SITUATION
patient and pt's belongings not observed in room during rounding at approximately 21:45 on 2/15/21. Left arm IV in place when I last saw pt in room at 21:15 on 2/15/21

## 2021-02-16 NOTE — PROVIDER CONTACT NOTE (OTHER) - ACTION/TREATMENT ORDERED:
Left arm IV removal confirmation completed at 23:00 with witnesses: 2 supervisors and 2 unit staff. pt left [facility] lobby at approximately 23:20

## 2021-02-16 NOTE — PROVIDER CONTACT NOTE (OTHER) - BACKGROUND
patient admitted with ETOH and heroin withdrawal, CIWA with librium taper with prn ativan; on methadone SIRS 2/2 withdrawal

## 2021-02-18 DIAGNOSIS — Z71.89 OTHER SPECIFIED COUNSELING: ICD-10-CM

## 2022-09-19 ENCOUNTER — EMERGENCY (EMERGENCY)
Facility: HOSPITAL | Age: 32
LOS: 1 days | Discharge: HOME | End: 2022-09-19
Attending: EMERGENCY MEDICINE | Admitting: EMERGENCY MEDICINE

## 2022-09-19 VITALS
OXYGEN SATURATION: 100 % | TEMPERATURE: 98 F | RESPIRATION RATE: 18 BRPM | HEART RATE: 74 BPM | DIASTOLIC BLOOD PRESSURE: 69 MMHG | SYSTOLIC BLOOD PRESSURE: 122 MMHG

## 2022-09-19 VITALS
HEART RATE: 77 BPM | TEMPERATURE: 98 F | HEIGHT: 71 IN | SYSTOLIC BLOOD PRESSURE: 126 MMHG | RESPIRATION RATE: 18 BRPM | OXYGEN SATURATION: 99 % | DIASTOLIC BLOOD PRESSURE: 70 MMHG

## 2022-09-19 LAB
ALBUMIN SERPL ELPH-MCNC: 5 G/DL — SIGNIFICANT CHANGE UP (ref 3.3–5)
ALP SERPL-CCNC: 54 U/L — SIGNIFICANT CHANGE UP (ref 40–120)
ALT FLD-CCNC: 29 U/L — SIGNIFICANT CHANGE UP (ref 4–41)
ANION GAP SERPL CALC-SCNC: 12 MMOL/L — SIGNIFICANT CHANGE UP (ref 7–14)
AST SERPL-CCNC: 24 U/L — SIGNIFICANT CHANGE UP (ref 4–40)
BASOPHILS # BLD AUTO: 0.02 K/UL — SIGNIFICANT CHANGE UP (ref 0–0.2)
BASOPHILS NFR BLD AUTO: 0.2 % — SIGNIFICANT CHANGE UP (ref 0–2)
BILIRUB SERPL-MCNC: 0.2 MG/DL — SIGNIFICANT CHANGE UP (ref 0.2–1.2)
BUN SERPL-MCNC: 18 MG/DL — SIGNIFICANT CHANGE UP (ref 7–23)
CALCIUM SERPL-MCNC: 9.8 MG/DL — SIGNIFICANT CHANGE UP (ref 8.4–10.5)
CHLORIDE SERPL-SCNC: 102 MMOL/L — SIGNIFICANT CHANGE UP (ref 98–107)
CO2 SERPL-SCNC: 24 MMOL/L — SIGNIFICANT CHANGE UP (ref 22–31)
CREAT SERPL-MCNC: 0.87 MG/DL — SIGNIFICANT CHANGE UP (ref 0.5–1.3)
EGFR: 117 ML/MIN/1.73M2 — SIGNIFICANT CHANGE UP
EOSINOPHIL # BLD AUTO: 0.06 K/UL — SIGNIFICANT CHANGE UP (ref 0–0.5)
EOSINOPHIL NFR BLD AUTO: 0.7 % — SIGNIFICANT CHANGE UP (ref 0–6)
GLUCOSE SERPL-MCNC: 95 MG/DL — SIGNIFICANT CHANGE UP (ref 70–99)
HCT VFR BLD CALC: 42.8 % — SIGNIFICANT CHANGE UP (ref 39–50)
HGB BLD-MCNC: 14.6 G/DL — SIGNIFICANT CHANGE UP (ref 13–17)
IANC: 3.99 K/UL — SIGNIFICANT CHANGE UP (ref 1.8–7.4)
IMM GRANULOCYTES NFR BLD AUTO: 0.4 % — SIGNIFICANT CHANGE UP (ref 0–0.9)
LIDOCAIN IGE QN: 25 U/L — SIGNIFICANT CHANGE UP (ref 7–60)
LYMPHOCYTES # BLD AUTO: 3.87 K/UL — HIGH (ref 1–3.3)
LYMPHOCYTES # BLD AUTO: 45.6 % — HIGH (ref 13–44)
MCHC RBC-ENTMCNC: 29.1 PG — SIGNIFICANT CHANGE UP (ref 27–34)
MCHC RBC-ENTMCNC: 34.1 GM/DL — SIGNIFICANT CHANGE UP (ref 32–36)
MCV RBC AUTO: 85.4 FL — SIGNIFICANT CHANGE UP (ref 80–100)
MONOCYTES # BLD AUTO: 0.51 K/UL — SIGNIFICANT CHANGE UP (ref 0–0.9)
MONOCYTES NFR BLD AUTO: 6 % — SIGNIFICANT CHANGE UP (ref 2–14)
NEUTROPHILS # BLD AUTO: 3.99 K/UL — SIGNIFICANT CHANGE UP (ref 1.8–7.4)
NEUTROPHILS NFR BLD AUTO: 47.1 % — SIGNIFICANT CHANGE UP (ref 43–77)
NRBC # BLD: 0 /100 WBCS — SIGNIFICANT CHANGE UP (ref 0–0)
NRBC # FLD: 0 K/UL — SIGNIFICANT CHANGE UP (ref 0–0)
PLATELET # BLD AUTO: 295 K/UL — SIGNIFICANT CHANGE UP (ref 150–400)
POTASSIUM SERPL-MCNC: 4.8 MMOL/L — SIGNIFICANT CHANGE UP (ref 3.5–5.3)
POTASSIUM SERPL-SCNC: 4.8 MMOL/L — SIGNIFICANT CHANGE UP (ref 3.5–5.3)
PROT SERPL-MCNC: 8.4 G/DL — HIGH (ref 6–8.3)
RBC # BLD: 5.01 M/UL — SIGNIFICANT CHANGE UP (ref 4.2–5.8)
RBC # FLD: 11.9 % — SIGNIFICANT CHANGE UP (ref 10.3–14.5)
SODIUM SERPL-SCNC: 138 MMOL/L — SIGNIFICANT CHANGE UP (ref 135–145)
TROPONIN T, HIGH SENSITIVITY RESULT: <6 NG/L — SIGNIFICANT CHANGE UP
WBC # BLD: 8.48 K/UL — SIGNIFICANT CHANGE UP (ref 3.8–10.5)
WBC # FLD AUTO: 8.48 K/UL — SIGNIFICANT CHANGE UP (ref 3.8–10.5)

## 2022-09-19 PROCEDURE — 70498 CT ANGIOGRAPHY NECK: CPT | Mod: 26,MA

## 2022-09-19 PROCEDURE — 99285 EMERGENCY DEPT VISIT HI MDM: CPT

## 2022-09-19 PROCEDURE — 71046 X-RAY EXAM CHEST 2 VIEWS: CPT | Mod: 26

## 2022-09-19 PROCEDURE — 70496 CT ANGIOGRAPHY HEAD: CPT | Mod: 26,MA

## 2022-09-19 RX ORDER — ACETAMINOPHEN 500 MG
1000 TABLET ORAL ONCE
Refills: 0 | Status: COMPLETED | OUTPATIENT
Start: 2022-09-19 | End: 2022-09-19

## 2022-09-19 RX ORDER — SODIUM CHLORIDE 9 MG/ML
1000 INJECTION, SOLUTION INTRAVENOUS ONCE
Refills: 0 | Status: COMPLETED | OUTPATIENT
Start: 2022-09-19 | End: 2022-09-19

## 2022-09-19 RX ADMIN — SODIUM CHLORIDE 1000 MILLILITER(S): 9 INJECTION, SOLUTION INTRAVENOUS at 19:07

## 2022-09-19 RX ADMIN — Medication 400 MILLIGRAM(S): at 19:07

## 2022-09-19 NOTE — ED ADULT TRIAGE NOTE - CHIEF COMPLAINT QUOTE
pt c/o headache ans weakness x 2 days.  pt checked his BP at home the other day and it was 55/29.  pt ambulatory to triage

## 2022-09-19 NOTE — ED PROVIDER NOTE - CLINICAL SUMMARY MEDICAL DECISION MAKING FREE TEXT BOX
33 yo M pmh hld presents with headache, chest pain x2 days.  Low concern for subarachnoid hemorrhage as headache not maximal in onset, and is without associated photophobia or neck stiffness.  Low concern for meningitis as patient without fever, photophobia, or neck stiffness.  Low concern for acute coronary syndrome as chest pain non-exertional, non-radiating, and there is no nausea or diaphoresis.  Low concern for aortic dissection as chest pain non-acute onset, not radiating to back, not described as "tearing", no pulse or neuro deficit on exam.  Low concern for pulmonary embolism, patient is PERC negative with low pre-test probability of PE.  Will send labs, ct/cta given acute onset of headache, cxr, supportive care.  Dispo pending.

## 2022-09-19 NOTE — ED PROVIDER NOTE - OBJECTIVE STATEMENT
33 yo M pmh hld presents with headache, chest pain x2 days.  Patient states awoke with headache, acute onset, not maximal in onset, global, non-radiating, constant, associated with b/l eye pain without photophobia or blurriness, no alleviating factors.  Chest pain is left sided, non-exertional, non-pleuritic, non-radiating, sharp, worse when lying down and at night.  Denies fever, chills, numbness, tingling, weakness, neck pain, balance problems, trauma.  Used cocaine 4 days ago.   Cigarette smoker.  Lives at home, family is asymptomatic.  Denies family history of early cardiac death.

## 2022-09-19 NOTE — ED PROVIDER NOTE - PROGRESS NOTE DETAILS
ALEXANDER:  CT/CTA with incidental thyroid nodule.  Patient informed of results and need for non-emergent ultrasound for rule out of malignancy and further characterization of lesion.  Patient understands and has pcp to follow up with.  Headache improved.  Patient still with eye burning but no acuity changes.  Will provide opthalmology follow up.

## 2022-09-19 NOTE — ED PROVIDER NOTE - PHYSICAL EXAMINATION
GEN:  Non-toxic appearing, non-distressed, speaking full sentences, non-diaphoretic, AAOx3  HEENT:  NCAT, neck supple, EOMI, PERRLA, sclera anicteric, no conjunctival pallor or injection, no stridor, normal voice, no tonsillar exudate, uvula midline  CV:  regular rhythm and rate, s1/s2 audible, no murmurs, rubs or gallops, peripheral pulses 2+ and symmetric  PULM:  non-labored respirations, lungs clear to auscultation bilaterally, no wheezes, crackles or rales  ABD:  non distended, non-tender, no rebound, no guarding, negative Swartz's sign, bowel sounds normal, no cvat  MSK:  no gross deformity, non-tender extremities and joints, range of motion grossly normal appearing, no extremity edema, extremities warm and well perfused   NEURO:  AAOx3, CN II-XII intact, motor 5/5 in upper and lower extremities bilaterally, sensation grossly intact in extremities and trunk, finger to nose testing wnl, no nystagmus, negative Romberg, no pronator drift, no gait deficit  SKIN:  warm, dry, no rash or vesicles

## 2022-09-19 NOTE — ED PROVIDER NOTE - PATIENT PORTAL LINK FT
You can access the FollowMyHealth Patient Portal offered by [facility] by registering at the following website: [email or web address]. By joining [facility]’s FollowMyHealth portal, you will also be able to view your health information using other applications (apps) compatible with our system.

## 2022-09-19 NOTE — ED ADULT NURSE NOTE - OBJECTIVE STATEMENT
Pt received to intake 10b , awake and alert, A&OX4, ambulatory. C/o headache and feeling weak x2 days. States he had "low BP" at home. Unable to specify reading. Respirations even and unlabored. Resting comfortably. Denies CP, SOB, N/V, HA, dizziness, palpitations, blurry vision. NAD. 20G IV placed to RAC. Bed in lowest position, call bell within reach.

## 2022-09-19 NOTE — ED PROVIDER NOTE - NSFOLLOWUPINSTRUCTIONS_ED_ALL_ED_FT
General Headache    WHAT YOU NEED TO KNOW:  Headache pain may be mild or severe. Common causes include stress, medicines, and head injuries. Sleep problems, allergies, and hormone changes can also cause a headache. You may have frequent headaches that have no clear cause. Pain may start in another part of your body and move to your head. Headache pain can also move to other parts of your body. A headache can cause other symptoms, such as nausea and vomiting. A severe headache may be a sign of a stroke or other serious problem that needs immediate treatment.  DISCHARGE INSTRUCTIONS:  Call 911 for any of the following:   •You have any of the following signs of a stroke: ?Numbness or drooping on one side of your face   ?Weakness in an arm or leg  ?Confusion or difficulty speaking  ?Dizziness, a severe headache, or vision loss    Return to the emergency department if:   •You have a headache with neck stiffness and a fever.  •You have a constant headache and are vomiting.  •You have severe pain that does not get better after you take pain medicine.  •You have a headache and the pain worsens when you look into light.  •You have a headache and vision changes, such as blurred vision.  •You have a headache and are forgetful or confused.  Contact your healthcare provider if:   •You have a headache each day that does not get better, even after treatment.  •You have changes in your headaches, or new symptoms that occur when you have a headache.  •Others you live or work with also have headaches.  •You have questions or concerns about your condition or care.  Medicines: You may need any of the following:   •Medicines may be given to prevent or treat headache pain. Do not wait until the pain is severe to take your medicine. Ask your healthcare provider how to take the medicine safely.   •NSAIDs, such as ibuprofen, help decrease swelling, pain, and fever. This medicine is available with or without a doctor's order. NSAIDs can cause stomach bleeding or kidney problems in certain people. If you take blood thinner medicine, always ask if NSAIDs are safe for you. Always read the medicine label and follow directions. Do not give these medicines to children under 6 months of age without direction from your child's healthcare provider.  •Acetaminophen decreases pain and fever. It is available without a doctor's order. Ask how much to take and how often to take it. Follow directions. Read the labels of all other medicines you are using to see if they also contain acetaminophen, or ask your doctor or pharmacist. Acetaminophen can cause liver damage if not taken correctly. Do not use more than 4 grams (4,000 milligrams) total of acetaminophen in one day.   •Antinausea medicine may be given to calm your stomach and help prevent vomiting.  •Take your medicine as directed. Contact your healthcare provider if you think your medicine is not helping or if you have side effects. Tell him of her if you are allergic to any medicine. Keep a list of the medicines, vitamins, and herbs you take. Include the amounts, and when and why you take them. Bring the list or the pill bottles to follow-up visits. Carry your medicine list with you in case of an emergency.  Manage your symptoms:   •Rest in a dark and quiet room. This may help decrease your pain.  •Apply heat or ice as directed. Heat or ice may help decrease pain or muscle spasms. Apply heat or ice on the area for 20 minutes every 2 hours for as many days as directed. Your healthcare provider may recommend that you alternate heat and ice.  •Relax your muscles to help relieve a headache. Lie down in a comfortable position and close your eyes. Relax your muscles slowly. Start at your toes and work your way up your body. A massage or warm bath may also help relax your muscles.  Keep a headache record: Record the dates and times that you get headaches, and what you were doing before the headache started. Also record what you ate and drank in the 24 hours before the headache started. This might help your healthcare provider find the cause of your headaches and make a treatment plan. The record can also help you avoid headache triggers or manage your symptoms.  Get enough sleep: You should get 8 to 10 hours of sleep each night. Create a sleep schedule. Go to bed and wake up at the same times each day. It may be helpful to do something relaxing before bed. Do not watch television right before bed.  Do not smoke: Nicotine and other chemicals in cigarettes and cigars can trigger a headache or make it worse. Ask your healthcare provider for information if you currently smoke and need help to quit. E-cigarettes or smokeless tobacco still contain nicotine. Talk to your healthcare provider before you use these products.   Drink liquids as directed: You may need to drink more liquid to prevent dehydration. Dehydration can cause a headache. Ask your healthcare provider how much liquid to drink each day and which liquids are best for you.   Limit caffeine and alcohol as directed: Your headaches may be triggered by caffeine or alcohol. You may also develop a headache if you drink caffeine regularly and suddenly stop.  Eat a variety of healthy foods: Do not skip meals. Too little food can trigger a headache. Include fruits, vegetables, whole-grain breads, low-fat dairy products, beans, lean meat, and fish. Do not have trigger foods, such as chocolate and red wine. Foods that contain gluten, nitrates, MSG, or artificial sweeteners may also trigger a headache.  Follow up with your healthcare provider as directed: Write down your questions so you remember to ask them during your visits.   Dolor de yohana regular    LO QUE NECESITA SABER:  El dolor de yohana puede ser leve o intenso. Las causas comunes incluyen el estrés, medicamentos y lesiones en la yohana. Problemas de sueño, alergias y cambios hormonales también pueden causar kim de yohana. Puede que usted sufra de kim de yohana frecuentes sin que se conozca ortez causa. Es probable que el dolor empiece en otra parte de ortez cuerpo y pase a ortez yohana. El dolor de yohana también puede moverse hacia otras partes de ortez cuerpo. Un dolor de yohana puede causar otros síntomas, jojo náusea y vómito. Un dolor de yohana barbara puede incluso ser peter señal de derrame cerebral u otro problema richard que necesita de tratamiento inmediato.  INSTRUCCIONES SOBRE EL JERROD HOSPITALARIA:  Llame al 911 en maddie de presentar lo siguiente:  •Usted tiene alguno de los siguientes signos de derrame cerebral: ?Adormecimiento o caída de un lado de ortez jeremi  ?Debilidad en un brazo o peter pierna  ?Confusión o debilidad para hablar  ?Mareos o dolor de yohana intenso, o pérdida de la visión.  Regrese a la mitchell de emergencias si:  •Usted tiene un dolor de yohana con rigidez de shannon y fiebre.  •Usted tiene un dolor de yohana elinor y está vomitando.  •Usted tiene dolor severo que no se darrel después de isadora lima medicamentos para el dolor.  •Usted tiene dolor de yohana y el dolor empeora cuando usted gerardo hacia la belem.  •Usted tiene dolor de yohana y cambios en la vista, jojo visión borrosa.  •Usted tiene dolor de yohana y está olvidadizo o confundido.  Comuníquese con ortez médico si:  •Usted tiene dolor de yohana todos los días y no se darrel aun después de tratarlo.  •Lima kim de yohana cambian u ocurren nuevos síntomas cuando tiene dolor de yohana.  •Otras personas con las que usted vive o trabaja también tienen kim de yohana.  •Usted tiene preguntas o inquietudes acerca de ortez condición o cuidado.  Medicamentos:Es posible que usted necesite alguno de los siguientes:   •Los medicamentosPueden darse medicamentospara prevenir o tratar el dolor de yohana. No espere hasta que el dolor sea severo para isadora ortez medicamento. Pregunte al médico cómo debe isadora camille medicamento de forma delgado.  •Los DANE,jojo el ibuprofeno, ayudan a disminuir la inflamación, el dolor y la fiebre. Camille medicamento está disponible con o sin peter receta médica. Los DANE pueden causar sangrado estomacal o problemas renales en ciertas personas. Si usted esta tomando un anticoágulante,  siempre pregunte si los AINEs son seguros para usted. Siempre zara la etiqueta de camille medicamento y siga las instrucciones. No administre camille medicamento a niños menores de 6 meses de baron sin antes obtener la autorización de ortez médico.  •Acetaminofénalivia el dolor y baja la fiebre. Está disponible sin receta médica. Pregunte la cantidad y la frecuencia con que debe tomarlos. Siga las indicaciones. Zara las etiquetas de todos los demás medicamentos que esté usando para saber si también contienen acetaminofén, o pregunte a ortez médico o farmacéutico. El acetaminofén puede causar daño en el hígado cuando no se henrik de forma correcta. No use más de 4 gramos (4000 miligramos) en total de acetaminofeno en un día.  •Los medicamentos contra las náuseaspueden darse para calmar ortez estómago y ayudar a prevenir los vómitos.  •[place] lima medicamentos jojo se le haya indicado.Consulte con ortez médico si usted shmuel que ortez medicamento no le está ayudando o si presenta efectos secundarios. Infórmele si es alérgico a algún medicamento. Mantenga peter lista actualizada de los medicamentos, las vitaminas y los productos herbales que henrik. Incluya los siguientes datos de los medicamentos: cantidad, frecuencia y motivo de administración. Traiga con usted la lista o los envases de las píldoras a lima citas de seguimiento. Lleve la lista de los medicamentos con usted en maddie de peter emergencia.  El manejo de lima síntomas:  •Descanse en peter habitación oscura y tranquila.[place] jojo consecuencia podría ayudar a disminuir ortez dolor.  •Aplique calor o hielo según lo indicado.El calor o el hielo pueden ayudar a disminuir el dolor o los espasmos musculares. Aplíquese calor o hielo en el área por 20 minutos cada 2 horas por tantos días jojo se lo recomienden. Es probable que ortez médico le recomiende que alterne entre calor y hielo.  •Relaje lima músculos para ayudar a aliviar ortez dolor de yohana.Acuéstese en peter posición cómoda y cierre lima ojos. Relaje lima músculos lentamente. Comience por los dedos de los pies y avance hacia arriba al lamonte de ortez cuerpo. Un masaje o baño en agua tibia también pueden ayudar a relajar lima músculos.  Mantenga un registro de lima kim de yohana:Escriba en el diario las fechas y las horas en que usted sufre un dolor de yohana y lo que estaba haciendo antes de que empezara. Registre también lo que comió y bebió gely las 24 horas previas a que comenzara el dolor de yohana. [place] puede ayudar a ortez médico a encontrar la causa de lima kim de yohana y así poder crear un plan de tratamiento. [place] anotaciones también pueden ayudarle a evitar lo que provoca lima kim de yohana o manejar lima síntomas.  Duerma suficiente:Usted debería dormir entre 8 y 10 horas cada noche. Establezca un horario para dormir. Acuéstese y levántese a la misma hora todos los días. Puede resultarle útil hacer algo relajante antes de acostarse. No marjorie televisión antes de acostarse.  No fume:La nicotina y otras sustancias químicas en los cigarrillos y cigarros pueden provocar un dolor de yohana tensional o empeorarlo. Pida información a ortez médico si usted actualmente fuma y necesita ayuda para dejar de fumar. Los cigarrillos electrónicos o el tabaco sin humo igualmente contienen nicotina. Consulte con ortez médico antes de utilizar estos productos.  [place] líquidos según lima indicaciones:Es probable que usted necesite isadora más líquido para prevenir la deshidratación. La deshidratación puede causar kim de yohana. Pregunte a ortez médico sobre la cantidad de líquido que necesita isadora todos los suzanna y cuáles le recomienda.  Limite la cafeína y las bebidas alcohólicas según se le haya indicado:[place]ima kim de yohana pueden ser causados por la cafeína o el alcohol. Es probable que usted también desarrolle un dolor de yohana si henrik cafeína regularmente y brandy de tomarla repentinamente.  Consuma alimentos saludables y variados:No se salte ninguna comida. [place] demasiado poco puede causar un dolor de yohana. Incluya frutas, vegetales, panes integrales, productos lácteos bajos en grasas, frijoles, cristóbal magras y pescado. No coma alimentos que provoquen lima kim de yohana, jojo chocolate y vino tinto. Alimentos que contienen gluten, nitratos, monosodio glutamato (MSG) o azúcares artificiales también pueden llegar a causar un dolor de yohana.  Acuda a lima consultas de control con ortez médico según le indicaron.Anote lima preguntas para que se acuerde de hacerlas gely lima visitas.

## 2022-09-20 PROBLEM — F10.10 ALCOHOL ABUSE, UNCOMPLICATED: Chronic | Status: ACTIVE | Noted: 2021-02-12

## 2022-09-20 PROBLEM — F11.10 OPIOID ABUSE, UNCOMPLICATED: Chronic | Status: ACTIVE | Noted: 2021-02-12

## 2023-08-27 ENCOUNTER — EMERGENCY (EMERGENCY)
Facility: HOSPITAL | Age: 33
LOS: 1 days | Discharge: HOME | End: 2023-08-27
Attending: STUDENT IN AN ORGANIZED HEALTH CARE EDUCATION/TRAINING PROGRAM | Admitting: STUDENT IN AN ORGANIZED HEALTH CARE EDUCATION/TRAINING PROGRAM
Payer: MEDICAID

## 2023-08-27 VITALS
DIASTOLIC BLOOD PRESSURE: 76 MMHG | TEMPERATURE: 98 F | RESPIRATION RATE: 15 BRPM | OXYGEN SATURATION: 97 % | SYSTOLIC BLOOD PRESSURE: 122 MMHG | HEART RATE: 92 BPM

## 2023-08-27 VITALS
OXYGEN SATURATION: 99 % | TEMPERATURE: 98 F | HEART RATE: 76 BPM | RESPIRATION RATE: 16 BRPM | DIASTOLIC BLOOD PRESSURE: 80 MMHG | SYSTOLIC BLOOD PRESSURE: 106 MMHG

## 2023-08-27 LAB
ALBUMIN SERPL ELPH-MCNC: 4.4 G/DL — SIGNIFICANT CHANGE UP (ref 3.3–5)
ALP SERPL-CCNC: 51 U/L — SIGNIFICANT CHANGE UP (ref 40–120)
ALT FLD-CCNC: 25 U/L — SIGNIFICANT CHANGE UP (ref 4–41)
ANION GAP SERPL CALC-SCNC: 13 MMOL/L — SIGNIFICANT CHANGE UP (ref 7–14)
AST SERPL-CCNC: 24 U/L — SIGNIFICANT CHANGE UP (ref 4–40)
BASE EXCESS BLDV CALC-SCNC: 0.4 MMOL/L — SIGNIFICANT CHANGE UP (ref -2–3)
BASE EXCESS BLDV CALC-SCNC: 2.3 MMOL/L — SIGNIFICANT CHANGE UP (ref -2–3)
BASOPHILS # BLD AUTO: 0.03 K/UL — SIGNIFICANT CHANGE UP (ref 0–0.2)
BASOPHILS NFR BLD AUTO: 0.2 % — SIGNIFICANT CHANGE UP (ref 0–2)
BILIRUB SERPL-MCNC: 0.4 MG/DL — SIGNIFICANT CHANGE UP (ref 0.2–1.2)
BLOOD GAS VENOUS COMPREHENSIVE RESULT: SIGNIFICANT CHANGE UP
BLOOD GAS VENOUS COMPREHENSIVE RESULT: SIGNIFICANT CHANGE UP
BUN SERPL-MCNC: 14 MG/DL — SIGNIFICANT CHANGE UP (ref 7–23)
CALCIUM SERPL-MCNC: 9.3 MG/DL — SIGNIFICANT CHANGE UP (ref 8.4–10.5)
CHLORIDE BLDV-SCNC: 100 MMOL/L — SIGNIFICANT CHANGE UP (ref 96–108)
CHLORIDE BLDV-SCNC: 98 MMOL/L — SIGNIFICANT CHANGE UP (ref 96–108)
CHLORIDE SERPL-SCNC: 96 MMOL/L — LOW (ref 98–107)
CO2 BLDV-SCNC: 30.2 MMOL/L — HIGH (ref 22–26)
CO2 BLDV-SCNC: 32 MMOL/L — HIGH (ref 22–26)
CO2 SERPL-SCNC: 26 MMOL/L — SIGNIFICANT CHANGE UP (ref 22–31)
CREAT SERPL-MCNC: 1.2 MG/DL — SIGNIFICANT CHANGE UP (ref 0.5–1.3)
EGFR: 81 ML/MIN/1.73M2 — SIGNIFICANT CHANGE UP
EOSINOPHIL # BLD AUTO: 0.01 K/UL — SIGNIFICANT CHANGE UP (ref 0–0.5)
EOSINOPHIL NFR BLD AUTO: 0.1 % — SIGNIFICANT CHANGE UP (ref 0–6)
GAS PNL BLDV: 132 MMOL/L — LOW (ref 136–145)
GAS PNL BLDV: 135 MMOL/L — LOW (ref 136–145)
GLUCOSE BLDV-MCNC: 131 MG/DL — HIGH (ref 70–99)
GLUCOSE BLDV-MCNC: 259 MG/DL — HIGH (ref 70–99)
GLUCOSE SERPL-MCNC: 240 MG/DL — HIGH (ref 70–99)
HCO3 BLDV-SCNC: 28 MMOL/L — SIGNIFICANT CHANGE UP (ref 22–29)
HCO3 BLDV-SCNC: 30 MMOL/L — HIGH (ref 22–29)
HCT VFR BLD CALC: 41.4 % — SIGNIFICANT CHANGE UP (ref 39–50)
HCT VFR BLDA CALC: 42 % — SIGNIFICANT CHANGE UP (ref 39–51)
HCT VFR BLDA CALC: 43 % — SIGNIFICANT CHANGE UP (ref 39–51)
HGB BLD CALC-MCNC: 14 G/DL — SIGNIFICANT CHANGE UP (ref 12.6–17.4)
HGB BLD CALC-MCNC: 14.4 G/DL — SIGNIFICANT CHANGE UP (ref 12.6–17.4)
HGB BLD-MCNC: 14 G/DL — SIGNIFICANT CHANGE UP (ref 13–17)
IANC: 14.09 K/UL — HIGH (ref 1.8–7.4)
IMM GRANULOCYTES NFR BLD AUTO: 0.3 % — SIGNIFICANT CHANGE UP (ref 0–0.9)
LACTATE BLDV-MCNC: 1.3 MMOL/L — SIGNIFICANT CHANGE UP (ref 0.5–2)
LACTATE BLDV-MCNC: 2.9 MMOL/L — HIGH (ref 0.5–2)
LYMPHOCYTES # BLD AUTO: 1.03 K/UL — SIGNIFICANT CHANGE UP (ref 1–3.3)
LYMPHOCYTES # BLD AUTO: 6.4 % — LOW (ref 13–44)
MCHC RBC-ENTMCNC: 29.4 PG — SIGNIFICANT CHANGE UP (ref 27–34)
MCHC RBC-ENTMCNC: 33.8 GM/DL — SIGNIFICANT CHANGE UP (ref 32–36)
MCV RBC AUTO: 87 FL — SIGNIFICANT CHANGE UP (ref 80–100)
MONOCYTES # BLD AUTO: 0.86 K/UL — SIGNIFICANT CHANGE UP (ref 0–0.9)
MONOCYTES NFR BLD AUTO: 5.4 % — SIGNIFICANT CHANGE UP (ref 2–14)
NEUTROPHILS # BLD AUTO: 14.09 K/UL — HIGH (ref 1.8–7.4)
NEUTROPHILS NFR BLD AUTO: 87.6 % — HIGH (ref 43–77)
NRBC # BLD: 0 /100 WBCS — SIGNIFICANT CHANGE UP (ref 0–0)
NRBC # FLD: 0 K/UL — SIGNIFICANT CHANGE UP (ref 0–0)
PCO2 BLDV: 59 MMHG — HIGH (ref 42–55)
PCO2 BLDV: 60 MMHG — HIGH (ref 42–55)
PH BLDV: 7.29 — LOW (ref 7.32–7.43)
PH BLDV: 7.31 — LOW (ref 7.32–7.43)
PLATELET # BLD AUTO: 287 K/UL — SIGNIFICANT CHANGE UP (ref 150–400)
PO2 BLDV: 32 MMHG — SIGNIFICANT CHANGE UP (ref 25–45)
PO2 BLDV: 45 MMHG — SIGNIFICANT CHANGE UP (ref 25–45)
POTASSIUM BLDV-SCNC: 4 MMOL/L — SIGNIFICANT CHANGE UP (ref 3.5–5.1)
POTASSIUM BLDV-SCNC: 4.4 MMOL/L — SIGNIFICANT CHANGE UP (ref 3.5–5.1)
POTASSIUM SERPL-MCNC: 4.3 MMOL/L — SIGNIFICANT CHANGE UP (ref 3.5–5.3)
POTASSIUM SERPL-SCNC: 4.3 MMOL/L — SIGNIFICANT CHANGE UP (ref 3.5–5.3)
PROT SERPL-MCNC: 7.7 G/DL — SIGNIFICANT CHANGE UP (ref 6–8.3)
RBC # BLD: 4.76 M/UL — SIGNIFICANT CHANGE UP (ref 4.2–5.8)
RBC # FLD: 12.8 % — SIGNIFICANT CHANGE UP (ref 10.3–14.5)
SAO2 % BLDV: 43.3 % — LOW (ref 67–88)
SAO2 % BLDV: 69.2 % — SIGNIFICANT CHANGE UP (ref 67–88)
SODIUM SERPL-SCNC: 135 MMOL/L — SIGNIFICANT CHANGE UP (ref 135–145)
WBC # BLD: 16.07 K/UL — HIGH (ref 3.8–10.5)
WBC # FLD AUTO: 16.07 K/UL — HIGH (ref 3.8–10.5)

## 2023-08-27 PROCEDURE — 93010 ELECTROCARDIOGRAM REPORT: CPT

## 2023-08-27 PROCEDURE — 71045 X-RAY EXAM CHEST 1 VIEW: CPT | Mod: 26

## 2023-08-27 PROCEDURE — 99285 EMERGENCY DEPT VISIT HI MDM: CPT

## 2023-08-27 RX ORDER — SODIUM CHLORIDE 9 MG/ML
1000 INJECTION INTRAMUSCULAR; INTRAVENOUS; SUBCUTANEOUS ONCE
Refills: 0 | Status: COMPLETED | OUTPATIENT
Start: 2023-08-27 | End: 2023-08-27

## 2023-08-27 RX ADMIN — SODIUM CHLORIDE 1000 MILLILITER(S): 9 INJECTION INTRAMUSCULAR; INTRAVENOUS; SUBCUTANEOUS at 02:20

## 2023-08-27 NOTE — ED ADULT NURSE REASSESSMENT NOTE - NS ED NURSE REASSESS COMMENT FT1
Report given by Break RN, Pt lying in bed comfortably. Pt denies any pain or discomfort. Pt just went to CT. Pt safety maintained.

## 2023-08-27 NOTE — ED ADULT NURSE NOTE - CAS DISCH TRANSFER METHOD
Patient Information     Patient Name MRKaren Ramirez [id number] Female 1947      Telephone Encounter by Daisy Ascencio at 3/14/2017 12:03 PM     Author:  Daisy Ascencio Service:  (none) Author Type:  (none)     Filed:  3/14/2017 12:07 PM Encounter Date:  3/14/2017 Status:  Signed     :  Daisy Ascencio            Wendy from [facility] called to let us know that the Patient's  cannot find the  for the wound vac.  Another one has been ordered through [facility] but they are not sure when it'll arrive.  They are wondering if they can go back to the dankins wet to dry twice a day until it comes.  Please call to discuss and advise.  Daisy Ascencio ....................  3/14/2017   12:06 PM         Private car

## 2023-08-27 NOTE — ED ADULT NURSE NOTE - OBJECTIVE STATEMENT
33 yom presents A&Ox4 appears sleepy at this time, admits to snorting heroin prior to arrival. Pt received Narcan x2 in the field w/ EMS. Respirations even and unlabored, normal sinus on cardiac monitor, sating 100% on room air, speaking in full sentences without any difficulty. Pt denies any suicidal or homicidal ideations, chest pain, sob, dizziness, N/V/D. Pending MD siddiqui. bed in lowest position, side rails up, call bell in hand, safety maintained. awaiting further orders.

## 2023-08-27 NOTE — ED PROVIDER NOTE - NSFOLLOWUPINSTRUCTIONS_ED_ALL_ED_FT
Opioid Overdose  Opioids are drugs that are often used to treat pain. Opioids include illegal drugs, such as heroin, as well as prescription pain medicines, such as codeine, morphine, hydrocodone, and fentanyl.    An opioid overdose happens when you take too much of an opioid. An overdose may be intentional or accidental and can happen with any type of opioid.    The effects of an overdose can be mild, dangerous, or even deadly. Opioid overdose is a medical emergency.    What are the causes?  This condition may be caused by:  Taking too much of an opioid on purpose.  Taking too much of an opioid by accident.  Using two or more substances that contain opioids at the same time.  Taking an opioid with a substance that affects your heart, breathing, or blood pressure. These include alcohol, tranquilizers, sleeping pills, illegal drugs, and some over-the-counter medicines.  This condition may also happen due to an error made by:  A health care provider who prescribes a medicine.  The pharmacist who fills the prescription.  What increases the risk?  This condition is more likely in:  Children. They may be attracted to colorful pills. Because of a child's small size, even a small amount of a medicine can be dangerous.  Older people. They may be taking many different medicines. Older people may have difficulty reading labels or remembering when they last took their medicines. They may also be more sensitive to the effects of opioids.  People with chronic medical conditions, especially heart, liver, kidney, or neurological diseases.  People who take an opioid for a long period of time.  People who take opioids and use illegal drugs, such as heroin, or other substances, such as alcohol.  People who:  Have a history of drug or alcohol abuse.  Have certain mental health conditions.  Have a history of previous drug overdoses.  People who take opioids that are not prescribed for them.  What are the signs or symptoms?  Symptoms of this condition depend on the type of opioid and the amount that was taken. Common symptoms include:  Sleepiness or difficulty waking from sleep.  Confusion.  Slurred speech.  Slowed breathing and a slow pulse (bradycardia).  Nausea and vomiting.  Abnormally small pupils.  Signs and symptoms that require emergency treatment include:  Cold, clammy, and pale skin.  Blue lips and fingernails.  Vomiting.  Gurgling sounds in the throat.  A pulse that is very slow or difficult to detect.  Breathing that is very irregular, slow, noisy, or difficult to detect.  Inability to respond to speech or be awakened from sleep (stupor).  Seizures.  How is this diagnosed?  This condition is diagnosed based on your symptoms and medical history. It is important to tell your health care provider:  About all of the opioids that you took.  When you took the opioids.  Whether you were drinking alcohol or using marijuana, cocaine, or other drugs.  Your health care provider will do a physical exam. This exam may include:  Checking and monitoring your heart rate and rhythm, breathing rate, temperature, and blood pressure.  Measuring oxygen levels in your blood.  Checking for abnormally small pupils.  You may also have blood tests or urine tests. You may have X-rays if you are having severe breathing problems.    How is this treated?  This condition requires immediate medical treatment and hospitalization. Reversing the effects of the opioid is the first step in treatment. If you have a Narcan kit or naloxone, use it right away. Follow your health care provider's instructions. A friend or family member can also help you with this.    The rest of your treatment will be given in the hospital intensive care (ICU). Treatment in the hospital may include:  Giving salts and minerals (electrolytes) along with fluids through an IV.  Inserting a breathing tube (endotracheal tube) in your airway to help you breathe if you cannot breathe on your own or you are in danger of not being able to breathe on your own.  Giving oxygen through a small tube under your nose.  Passing a tube through your nose and into your stomach (nasogastric tube, or NG tube) to empty your stomach.  Giving medicines that:  Increase your blood pressure.  Relieve nausea and vomiting.  Relieve abdominal pain and cramping.  Reverse the effects of the opioid (naloxone).  Monitoring your heart and oxygen levels.  Ongoing counseling and mental health support if you intentionally overdosed or used an illegal drug.  Follow these instructions at home:  Three cups showing dark yellow, yellow, and pale yellow urine.  Medicines    Take over-the-counter and prescription medicines only as told by your health care provider.  Always ask your health care provider about possible side effects and interactions of any new medicine that you start taking.  Keep a list of all the medicines that you take, including over-the-counter medicines. Bring this list with you to all your medical visits.  General instructions    Drink enough fluid to keep your urine pale yellow.  Keep all follow-up visits. This is important.  How is this prevented?  Read the drug inserts that come with your opioid pain medicines.  Take medicines only as told by your health care provider. Do not take more medicine than you are told. Do not take medicines more frequently than you are told.  Do not drink alcohol or take sedatives when taking opioids.  Do not use illegal or recreational drugs, including cocaine, ecstasy, and marijuana.  Do not take opioid medicines that are not prescribed for you.  Store all medicines in safety containers that are out of the reach of children.  Get help if you are struggling with:  Alcohol or drug use.  Depression or another mental health problem.  Thoughts of hurting yourself or another person.  Keep the phone number of your local poison control center near your phone or in your mobile phone. In the [place] the hotline of the National Poison Control Center is (256) 668-2288.  If you were prescribed naloxone, make sure you understand how to take it.  Contact a health care provider if:  You need help understanding how to take your pain medicines.  You feel your medicines are too strong.  You are concerned that your pain medicines are not working well for your pain.  You develop new symptoms or side effects when you are taking medicines.  Get help right away if:  You or someone else is having symptoms of an opioid overdose. Get help even if you are not sure.  You have thoughts about hurting yourself or others.  You have:  Chest pain.  Difficulty breathing.  A loss of consciousness.  These symptoms may represent a serious problem that is an emergency. Do not wait to see if the symptoms will go away. Get medical help right away. Call your local emergency services (182 in the [place] Do not drive yourself to the hospital.    If you ever feel like you may hurt yourself or others, or have thoughts about taking your own life, get help right away. You can go to your nearest emergency department or:  Call your local emergency services (858 in the [place]  Call a suicide crisis helpline, such as the National Suicide Prevention Lifeline at 1-310.571.6798 or 834 in the [place] This is open 24 hours a day in the [place]  Text the Crisis Text Line at 253233 (in the [place]  Summary  Opioids are drugs that are often used to treat pain. Opioids include illegal drugs, such as heroin, as well as prescription pain medicines.  An opioid overdose happens when you take too much of an opioid.  Overdoses can be intentional or accidental.  Opioid overdose is very dangerous. It is a life-threatening emergency.  If you or someone you know is experiencing an opioid overdose, get help right away.  This information is not intended to replace advice given to you by your health care provider. Make sure you discuss any questions you have with your health care provider.    Document Revised: 07/13/2022 Document Reviewed: 03/30/2022  Elsevier Patient Education © 2023 [employer]

## 2023-08-27 NOTE — ED PROVIDER NOTE - CLINICAL SUMMARY MEDICAL DECISION MAKING FREE TEXT BOX
32 y/o male, no significant PMHx, brought in by EMS after a heroin overdose. Patient states that he relapsed after 14 months, no suicidal intent. Was found by mom and sister who called EMS. Endorses mild headache and nausea. Otherwise no other symptoms, including fever, chills, SOB, CP, abdominal pain, weakness, LE edema. Patient is well-appearing, in NAD. Vital signs WNL.    Basic labs, EKG. Monitor vitals. Likely d/c home.

## 2023-08-27 NOTE — ED ADULT NURSE NOTE - NSFALLUNIVINTERV_ED_ALL_ED
Bed/Stretcher in lowest position, wheels locked, appropriate side rails in place/Call bell, personal items and telephone in reach/Instruct patient to call for assistance before getting out of bed/chair/stretcher/Non-slip footwear applied when patient is off stretcher/[place] to call system/Physically safe environment - no spills, clutter or unnecessary equipment/Purposeful proactive rounding/Room/bathroom lighting operational, light cord in reach

## 2023-08-27 NOTE — ED PROVIDER NOTE - PROGRESS NOTE DETAILS
[place] (PGY1): Pt reassessed. He's alert and oriented with vital signs WNL    Patient requesting discharge. Denies needing social work/resources. Return precautions discussed with patient who verbalized understanding and is agreement. Pt had opportunity to ask questions and address concerns, and results of workup including lab and imaging, and incidental findings, were reviewed and provided in DC paperwork. Patient is medically clear for DC at this time.

## 2023-08-27 NOTE — ED PROVIDER NOTE - ATTENDING CONTRIBUTION TO CARE
33-year-old male with a history of substance abuse presents with heroin overdose.  Patient states he has not used in 14 months and relapsed today and had overdose of heroin.  Denies any suicidality or intent to harm himself.  Patient's mother found him and called EMS who per triage note gave him Narcan x2 with response.  Patient states he has been awake for over an hour denies any chest pain shortness of breath abdominal pain does have some nausea no other complaints at this time.  Patient well-appearing lungs are clear abdomen is nontender sats within normal limits we will observe and discharge.  Patient states he has Narcan kit at home does not want to stay for social work at this time

## 2023-08-27 NOTE — ED PROVIDER NOTE - PATIENT PORTAL LINK FT
You can access the FollowMyHealth Patient Portal offered by [facility] by registering at the following website: [email or web address]. By joining [employer]’s FollowMyHealth portal, you will also be able to view your health information using other applications (apps) compatible with our system.

## 2023-08-27 NOTE — ED PROVIDER NOTE - OBJECTIVE STATEMENT
alcohol overdose. passed out. Cousin called EMS. undergoing divorce  + nausea, vomiting, LOC, mild headache 34 y/o male, no significant PMHx, brought in by EMS after a heroin overdose. Patient states that he relapsed after 14 months, no suicidal intent. Was found by mom and sister who called EMS. Endorses mild headache and nausea. Otherwise no other symptoms, including fever, chills, SOB, CP, abdominal pain, weakness,LE edema. 34 y/o male, no significant PMHx, brought in by EMS after a heroin overdose. Patient states that he relapsed after 14 months, no suicidal intent. Was found by mom and sister who called EMS. Endorses mild headache and nausea. Otherwise no other symptoms, including fever, chills, SOB, CP, abdominal pain, weakness, LE edema.

## 2023-08-27 NOTE — ED ADULT TRIAGE NOTE - CHIEF COMPLAINT QUOTE
presents S/P heroin overdose. a sper EMS pt received Narcan x2. Pt awake and alert. No complaints of chest pain, headache, nausea, dizziness, vomiting  SOB, fever, chills verbalized..

## 2024-03-01 NOTE — ED ADULT TRIAGE NOTE - NS ED TRIAGE AVPU SCALE
no edema, no murmurs, regular rate and rhythm Alert-The patient is alert, awake and responds to voice. The patient is oriented to time, place, and person. The triage nurse is able to obtain subjective information.

## 2024-12-04 NOTE — BEHAVIORAL HEALTH ASSESSMENT NOTE - NSBHCHARTREVIEWVS_PSY_A_CORE FT
Procedures     Vital Signs Last 24 Hrs  T(C): 37.1 (12 Feb 2021 13:25), Max: 37.2 (12 Feb 2021 11:59)  T(F): 98.8 (12 Feb 2021 13:25), Max: 98.9 (12 Feb 2021 11:59)  HR: 100 (12 Feb 2021 13:25) (100 - 129)  BP: 108/91 (12 Feb 2021 13:25) (106/77 - 108/91)  BP(mean): --  RR: 20 (12 Feb 2021 13:25) (20 - 20)  SpO2: 99% (12 Feb 2021 13:25) (96% - 99%)